# Patient Record
Sex: MALE | Race: ASIAN | NOT HISPANIC OR LATINO | Employment: UNEMPLOYED | URBAN - METROPOLITAN AREA
[De-identification: names, ages, dates, MRNs, and addresses within clinical notes are randomized per-mention and may not be internally consistent; named-entity substitution may affect disease eponyms.]

---

## 2017-05-22 ENCOUNTER — OFFICE VISIT - HEALTHEAST (OUTPATIENT)
Dept: FAMILY MEDICINE | Facility: CLINIC | Age: 77
End: 2017-05-22

## 2017-05-22 DIAGNOSIS — J02.9 SORE THROAT: ICD-10-CM

## 2017-05-22 DIAGNOSIS — I10 HIGH BLOOD PRESSURE: ICD-10-CM

## 2017-05-22 DIAGNOSIS — E11.9 TYPE 2 DIABETES MELLITUS WITHOUT COMPLICATION, WITHOUT LONG-TERM CURRENT USE OF INSULIN (H): ICD-10-CM

## 2017-05-22 LAB
HBA1C MFR BLD: 9.4 % (ref 3.5–6)
LDLC SERPL CALC-MCNC: 152 MG/DL

## 2017-05-22 ASSESSMENT — MIFFLIN-ST. JEOR: SCORE: 1171.2

## 2017-05-24 ENCOUNTER — COMMUNICATION - HEALTHEAST (OUTPATIENT)
Dept: FAMILY MEDICINE | Facility: CLINIC | Age: 77
End: 2017-05-24

## 2017-06-12 ENCOUNTER — COMMUNICATION - HEALTHEAST (OUTPATIENT)
Dept: FAMILY MEDICINE | Facility: CLINIC | Age: 77
End: 2017-06-12

## 2017-06-23 ENCOUNTER — OFFICE VISIT - HEALTHEAST (OUTPATIENT)
Dept: FAMILY MEDICINE | Facility: CLINIC | Age: 77
End: 2017-06-23

## 2017-06-23 DIAGNOSIS — I10 ESSENTIAL HYPERTENSION WITH GOAL BLOOD PRESSURE LESS THAN 140/90: ICD-10-CM

## 2017-06-23 DIAGNOSIS — E11.29 TYPE 2 DIABETES MELLITUS WITH KIDNEY COMPLICATION, WITHOUT LONG-TERM CURRENT USE OF INSULIN (H): ICD-10-CM

## 2017-06-23 DIAGNOSIS — N18.30 CKD (CHRONIC KIDNEY DISEASE) STAGE 3, GFR 30-59 ML/MIN (H): ICD-10-CM

## 2017-06-23 RX ORDER — GLUCOSAMINE HCL/CHONDROITIN SU 500-400 MG
1 CAPSULE ORAL PRN
Qty: 100 EACH | Refills: 11 | Status: SHIPPED | OUTPATIENT
Start: 2017-06-23 | End: 2021-09-23

## 2017-06-23 RX ORDER — BLOOD PRESSURE TEST KIT
KIT MISCELLANEOUS
Qty: 1 EACH | Refills: 0 | Status: SHIPPED | OUTPATIENT
Start: 2017-06-23 | End: 2021-09-23

## 2017-06-23 ASSESSMENT — MIFFLIN-ST. JEOR: SCORE: 1168.93

## 2018-06-07 ENCOUNTER — OFFICE VISIT - HEALTHEAST (OUTPATIENT)
Dept: FAMILY MEDICINE | Facility: CLINIC | Age: 78
End: 2018-06-07

## 2018-06-07 DIAGNOSIS — E78.00 PURE HYPERCHOLESTEROLEMIA: ICD-10-CM

## 2018-06-07 DIAGNOSIS — Z00.00 ROUTINE GENERAL MEDICAL EXAMINATION AT A HEALTH CARE FACILITY: ICD-10-CM

## 2018-06-07 DIAGNOSIS — R19.5 NONSPECIFIC ABNORMAL FINDING IN STOOL CONTENTS: ICD-10-CM

## 2018-06-07 DIAGNOSIS — N18.30 CKD (CHRONIC KIDNEY DISEASE) STAGE 3, GFR 30-59 ML/MIN (H): ICD-10-CM

## 2018-06-07 DIAGNOSIS — E11.29 TYPE 2 DIABETES MELLITUS WITH KIDNEY COMPLICATION, WITHOUT LONG-TERM CURRENT USE OF INSULIN (H): ICD-10-CM

## 2018-06-07 LAB
ALBUMIN SERPL-MCNC: 3.9 G/DL (ref 3.5–5)
ALP SERPL-CCNC: 146 U/L (ref 45–120)
ALT SERPL W P-5'-P-CCNC: 22 U/L (ref 0–45)
ANION GAP SERPL CALCULATED.3IONS-SCNC: 11 MMOL/L (ref 5–18)
AST SERPL W P-5'-P-CCNC: 19 U/L (ref 0–40)
BILIRUB SERPL-MCNC: 1 MG/DL (ref 0–1)
BUN SERPL-MCNC: 21 MG/DL (ref 8–28)
CALCIUM SERPL-MCNC: 9.8 MG/DL (ref 8.5–10.5)
CHLORIDE BLD-SCNC: 107 MMOL/L (ref 98–107)
CO2 SERPL-SCNC: 21 MMOL/L (ref 22–31)
CREAT SERPL-MCNC: 1.63 MG/DL (ref 0.7–1.3)
CREAT UR-MCNC: 197.8 MG/DL
ERYTHROCYTE [DISTWIDTH] IN BLOOD BY AUTOMATED COUNT: 11.6 % (ref 11–14.5)
GFR SERPL CREATININE-BSD FRML MDRD: 41 ML/MIN/1.73M2
GLUCOSE BLD-MCNC: 243 MG/DL (ref 70–125)
HBA1C MFR BLD: 12.6 % (ref 3.5–6)
HCT VFR BLD AUTO: 43.3 % (ref 40–54)
HGB BLD-MCNC: 14.6 G/DL (ref 14–18)
LDLC SERPL CALC-MCNC: 83 MG/DL
MCH RBC QN AUTO: 31.1 PG (ref 27–34)
MCHC RBC AUTO-ENTMCNC: 33.7 G/DL (ref 32–36)
MCV RBC AUTO: 92 FL (ref 80–100)
MICROALBUMIN UR-MCNC: 15.19 MG/DL (ref 0–1.99)
MICROALBUMIN/CREAT UR: 76.8 MG/G
PLATELET # BLD AUTO: 159 THOU/UL (ref 140–440)
PMV BLD AUTO: 8.7 FL (ref 7–10)
POTASSIUM BLD-SCNC: 4.3 MMOL/L (ref 3.5–5)
PROT SERPL-MCNC: 7.3 G/DL (ref 6–8)
RBC # BLD AUTO: 4.7 MILL/UL (ref 4.4–6.2)
SODIUM SERPL-SCNC: 139 MMOL/L (ref 136–145)
WBC: 9.3 THOU/UL (ref 4–11)

## 2018-06-07 ASSESSMENT — MIFFLIN-ST. JEOR: SCORE: 1132.64

## 2018-06-08 ENCOUNTER — RECORDS - HEALTHEAST (OUTPATIENT)
Dept: ADMINISTRATIVE | Facility: OTHER | Age: 78
End: 2018-06-08

## 2018-06-08 ENCOUNTER — COMMUNICATION - HEALTHEAST (OUTPATIENT)
Dept: FAMILY MEDICINE | Facility: CLINIC | Age: 78
End: 2018-06-08

## 2018-06-08 DIAGNOSIS — E11.21 TYPE 2 DIABETES MELLITUS WITH DIABETIC NEPHROPATHY, WITHOUT LONG-TERM CURRENT USE OF INSULIN (H): ICD-10-CM

## 2018-07-20 ENCOUNTER — OFFICE VISIT - HEALTHEAST (OUTPATIENT)
Dept: FAMILY MEDICINE | Facility: CLINIC | Age: 78
End: 2018-07-20

## 2018-07-20 DIAGNOSIS — N18.30 CKD (CHRONIC KIDNEY DISEASE) STAGE 3, GFR 30-59 ML/MIN (H): ICD-10-CM

## 2018-07-20 DIAGNOSIS — E11.3299 NON-PROLIFERATIVE DIABETIC RETINOPATHY (H): ICD-10-CM

## 2018-07-20 DIAGNOSIS — E11.29 TYPE 2 DIABETES MELLITUS WITH KIDNEY COMPLICATION, WITHOUT LONG-TERM CURRENT USE OF INSULIN (H): ICD-10-CM

## 2018-07-20 DIAGNOSIS — R80.9 PROTEINURIA: ICD-10-CM

## 2018-07-20 LAB — HBA1C MFR BLD: 9.4 % (ref 3.5–6)

## 2018-08-24 ENCOUNTER — RECORDS - HEALTHEAST (OUTPATIENT)
Dept: ADMINISTRATIVE | Facility: OTHER | Age: 78
End: 2018-08-24

## 2018-08-24 ENCOUNTER — OFFICE VISIT - HEALTHEAST (OUTPATIENT)
Dept: FAMILY MEDICINE | Facility: CLINIC | Age: 78
End: 2018-08-24

## 2018-08-24 DIAGNOSIS — N18.30 CKD (CHRONIC KIDNEY DISEASE) STAGE 3, GFR 30-59 ML/MIN (H): ICD-10-CM

## 2018-08-24 DIAGNOSIS — E11.3299 NON-PROLIFERATIVE DIABETIC RETINOPATHY (H): ICD-10-CM

## 2018-08-24 DIAGNOSIS — E11.29 TYPE 2 DIABETES MELLITUS WITH KIDNEY COMPLICATION, WITHOUT LONG-TERM CURRENT USE OF INSULIN (H): ICD-10-CM

## 2018-08-24 DIAGNOSIS — Z01.818 PREOPERATIVE EXAMINATION: ICD-10-CM

## 2018-08-24 DIAGNOSIS — E78.00 PURE HYPERCHOLESTEROLEMIA: ICD-10-CM

## 2018-08-24 LAB — HBA1C MFR BLD: 8.4 % (ref 3.5–6)

## 2018-08-24 ASSESSMENT — MIFFLIN-ST. JEOR: SCORE: 1148.51

## 2018-09-18 ENCOUNTER — RECORDS - HEALTHEAST (OUTPATIENT)
Dept: ADMINISTRATIVE | Facility: OTHER | Age: 78
End: 2018-09-18

## 2019-06-17 ENCOUNTER — OFFICE VISIT - HEALTHEAST (OUTPATIENT)
Dept: ALLERGY | Facility: CLINIC | Age: 79
End: 2019-06-17

## 2019-06-17 DIAGNOSIS — T78.3XXD ANGIOEDEMA, SUBSEQUENT ENCOUNTER: ICD-10-CM

## 2019-06-17 LAB — C4 SERPL-MCNC: 21 MG/DL (ref 19–59)

## 2019-06-17 ASSESSMENT — MIFFLIN-ST. JEOR: SCORE: 1214.29

## 2019-06-18 LAB — SHRIMP IGE QN: <0.35 KU/L

## 2019-06-19 LAB
DEPRECATED MISC ALLERGEN IGE RAST QL: NORMAL
LAMB IGE QN: <0.1 KU/L

## 2019-06-21 LAB — TRYPTASE SERPL-MCNC: 7.5 UG/L

## 2019-06-24 ENCOUNTER — COMMUNICATION - HEALTHEAST (OUTPATIENT)
Dept: ALLERGY | Facility: CLINIC | Age: 79
End: 2019-06-24

## 2019-09-03 ENCOUNTER — COMMUNICATION - HEALTHEAST (OUTPATIENT)
Dept: FAMILY MEDICINE | Facility: CLINIC | Age: 79
End: 2019-09-03

## 2019-09-03 DIAGNOSIS — E11.29 TYPE 2 DIABETES MELLITUS WITH KIDNEY COMPLICATION, WITHOUT LONG-TERM CURRENT USE OF INSULIN (H): ICD-10-CM

## 2019-10-02 ENCOUNTER — OFFICE VISIT - HEALTHEAST (OUTPATIENT)
Dept: FAMILY MEDICINE | Facility: CLINIC | Age: 79
End: 2019-10-02

## 2019-10-02 DIAGNOSIS — E11.3299 NON-PROLIFERATIVE DIABETIC RETINOPATHY (H): ICD-10-CM

## 2019-10-02 DIAGNOSIS — E11.21 TYPE 2 DIABETES MELLITUS WITH DIABETIC NEPHROPATHY, WITHOUT LONG-TERM CURRENT USE OF INSULIN (H): ICD-10-CM

## 2019-10-02 DIAGNOSIS — E78.00 PURE HYPERCHOLESTEROLEMIA: ICD-10-CM

## 2019-10-02 DIAGNOSIS — E11.29 TYPE 2 DIABETES MELLITUS WITH KIDNEY COMPLICATION, WITHOUT LONG-TERM CURRENT USE OF INSULIN (H): ICD-10-CM

## 2019-10-02 DIAGNOSIS — N18.30 CKD (CHRONIC KIDNEY DISEASE) STAGE 3, GFR 30-59 ML/MIN (H): ICD-10-CM

## 2019-10-02 DIAGNOSIS — I10 ESSENTIAL HYPERTENSION: ICD-10-CM

## 2019-10-02 DIAGNOSIS — R80.8 OTHER PROTEINURIA: ICD-10-CM

## 2019-10-02 LAB
ALBUMIN SERPL-MCNC: 4.1 G/DL (ref 3.5–5)
ALP SERPL-CCNC: 152 U/L (ref 45–120)
ALT SERPL W P-5'-P-CCNC: 25 U/L (ref 0–45)
ANION GAP SERPL CALCULATED.3IONS-SCNC: 9 MMOL/L (ref 5–18)
AST SERPL W P-5'-P-CCNC: 21 U/L (ref 0–40)
BILIRUB SERPL-MCNC: 0.5 MG/DL (ref 0–1)
BUN SERPL-MCNC: 23 MG/DL (ref 8–28)
CALCIUM SERPL-MCNC: 9.7 MG/DL (ref 8.5–10.5)
CHLORIDE BLD-SCNC: 106 MMOL/L (ref 98–107)
CO2 SERPL-SCNC: 25 MMOL/L (ref 22–31)
CREAT SERPL-MCNC: 1.97 MG/DL (ref 0.7–1.3)
CREAT UR-MCNC: 120.4 MG/DL
ERYTHROCYTE [DISTWIDTH] IN BLOOD BY AUTOMATED COUNT: 11.6 % (ref 11–14.5)
GFR SERPL CREATININE-BSD FRML MDRD: 33 ML/MIN/1.73M2
GLUCOSE BLD-MCNC: 270 MG/DL (ref 70–125)
HBA1C MFR BLD: 8.5 % (ref 3.5–6)
HCT VFR BLD AUTO: 42.5 % (ref 40–54)
HGB BLD-MCNC: 14.5 G/DL (ref 14–18)
LDLC SERPL CALC-MCNC: 115 MG/DL
MCH RBC QN AUTO: 31.3 PG (ref 27–34)
MCHC RBC AUTO-ENTMCNC: 34.2 G/DL (ref 32–36)
MCV RBC AUTO: 92 FL (ref 80–100)
MICROALBUMIN UR-MCNC: 17.39 MG/DL (ref 0–1.99)
MICROALBUMIN/CREAT UR: 144.4 MG/G
PLATELET # BLD AUTO: 145 THOU/UL (ref 140–440)
PMV BLD AUTO: 9 FL (ref 7–10)
POTASSIUM BLD-SCNC: 4 MMOL/L (ref 3.5–5)
PROT SERPL-MCNC: 7.5 G/DL (ref 6–8)
RBC # BLD AUTO: 4.63 MILL/UL (ref 4.4–6.2)
SODIUM SERPL-SCNC: 140 MMOL/L (ref 136–145)
WBC: 9.8 THOU/UL (ref 4–11)

## 2019-10-03 ENCOUNTER — COMMUNICATION - HEALTHEAST (OUTPATIENT)
Dept: FAMILY MEDICINE | Facility: CLINIC | Age: 79
End: 2019-10-03

## 2019-10-15 ENCOUNTER — COMMUNICATION - HEALTHEAST (OUTPATIENT)
Dept: GERIATRIC MEDICINE | Facility: CLINIC | Age: 79
End: 2019-10-15

## 2019-11-08 ENCOUNTER — OFFICE VISIT - HEALTHEAST (OUTPATIENT)
Dept: FAMILY MEDICINE | Facility: CLINIC | Age: 79
End: 2019-11-08

## 2019-11-08 DIAGNOSIS — J18.9 PNEUMONIA OF RIGHT LOWER LOBE DUE TO INFECTIOUS ORGANISM: ICD-10-CM

## 2019-11-08 DIAGNOSIS — R04.2 COUGH WITH HEMOPTYSIS: ICD-10-CM

## 2019-11-08 DIAGNOSIS — N18.30 CKD (CHRONIC KIDNEY DISEASE) STAGE 3, GFR 30-59 ML/MIN (H): ICD-10-CM

## 2019-11-08 LAB
ANION GAP SERPL CALCULATED.3IONS-SCNC: 9 MMOL/L (ref 5–18)
BUN SERPL-MCNC: 25 MG/DL (ref 8–28)
CHLORIDE BLD-SCNC: 109 MMOL/L (ref 98–107)
CO2 SERPL-SCNC: 24 MMOL/L (ref 22–31)
CREAT SERPL-MCNC: 2 MG/DL (ref 0.8–1.5)
ERYTHROCYTE [DISTWIDTH] IN BLOOD BY AUTOMATED COUNT: 10.7 % (ref 11–14.5)
FLUAV AG SPEC QL IA: NORMAL
FLUBV AG SPEC QL IA: NORMAL
GLUCOSE BLD-MCNC: 149 MG/DL (ref 70–125)
HCT VFR BLD AUTO: 42.9 % (ref 40–54)
HGB BLD-MCNC: 14.5 G/DL (ref 14–18)
MCH RBC QN AUTO: 31.5 PG (ref 27–34)
MCHC RBC AUTO-ENTMCNC: 33.8 G/DL (ref 32–36)
MCV RBC AUTO: 93 FL (ref 80–100)
PLATELET # BLD AUTO: 176 THOU/UL (ref 140–440)
PMV BLD AUTO: 7.9 FL (ref 7–10)
POTASSIUM BLD-SCNC: 4 MMOL/L (ref 3.5–5.5)
RBC # BLD AUTO: 4.61 MILL/UL (ref 4.4–6.2)
SODIUM SERPL-SCNC: 142 MMOL/L (ref 136–145)
WBC: 8.9 THOU/UL (ref 4–11)

## 2019-11-15 ENCOUNTER — OFFICE VISIT - HEALTHEAST (OUTPATIENT)
Dept: FAMILY MEDICINE | Facility: CLINIC | Age: 79
End: 2019-11-15

## 2019-11-15 DIAGNOSIS — I10 ESSENTIAL HYPERTENSION: ICD-10-CM

## 2019-11-15 DIAGNOSIS — N18.30 CKD (CHRONIC KIDNEY DISEASE) STAGE 3, GFR 30-59 ML/MIN (H): ICD-10-CM

## 2019-11-15 DIAGNOSIS — R80.8 OTHER PROTEINURIA: ICD-10-CM

## 2019-11-15 DIAGNOSIS — E11.21 TYPE 2 DIABETES MELLITUS WITH DIABETIC NEPHROPATHY, WITHOUT LONG-TERM CURRENT USE OF INSULIN (H): ICD-10-CM

## 2019-12-06 ENCOUNTER — COMMUNICATION - HEALTHEAST (OUTPATIENT)
Dept: GERIATRIC MEDICINE | Facility: CLINIC | Age: 79
End: 2019-12-06

## 2019-12-06 ENCOUNTER — COMMUNICATION - HEALTHEAST (OUTPATIENT)
Dept: FAMILY MEDICINE | Facility: CLINIC | Age: 79
End: 2019-12-06

## 2019-12-06 ENCOUNTER — OFFICE VISIT - HEALTHEAST (OUTPATIENT)
Dept: FAMILY MEDICINE | Facility: CLINIC | Age: 79
End: 2019-12-06

## 2019-12-06 ENCOUNTER — RECORDS - HEALTHEAST (OUTPATIENT)
Dept: GENERAL RADIOLOGY | Facility: CLINIC | Age: 79
End: 2019-12-06

## 2019-12-06 DIAGNOSIS — I10 ESSENTIAL HYPERTENSION: ICD-10-CM

## 2019-12-06 DIAGNOSIS — J18.9 PNEUMONIA OF RIGHT LOWER LOBE DUE TO INFECTIOUS ORGANISM: ICD-10-CM

## 2019-12-06 DIAGNOSIS — J81.1 CHRONIC PULMONARY EDEMA: ICD-10-CM

## 2019-12-06 DIAGNOSIS — R80.8 OTHER PROTEINURIA: ICD-10-CM

## 2019-12-06 DIAGNOSIS — J18.1 LOBAR PNEUMONIA, UNSPECIFIED ORGANISM (H): ICD-10-CM

## 2019-12-20 ENCOUNTER — OFFICE VISIT - HEALTHEAST (OUTPATIENT)
Dept: FAMILY MEDICINE | Facility: CLINIC | Age: 79
End: 2019-12-20

## 2019-12-20 DIAGNOSIS — E11.22 TYPE 2 DIABETES MELLITUS WITH STAGE 3 CHRONIC KIDNEY DISEASE, WITHOUT LONG-TERM CURRENT USE OF INSULIN (H): ICD-10-CM

## 2019-12-20 DIAGNOSIS — N18.30 TYPE 2 DIABETES MELLITUS WITH STAGE 3 CHRONIC KIDNEY DISEASE, WITHOUT LONG-TERM CURRENT USE OF INSULIN (H): ICD-10-CM

## 2019-12-20 DIAGNOSIS — I10 ESSENTIAL HYPERTENSION: ICD-10-CM

## 2019-12-20 DIAGNOSIS — R80.8 OTHER PROTEINURIA: ICD-10-CM

## 2020-01-08 ENCOUNTER — COMMUNICATION - HEALTHEAST (OUTPATIENT)
Dept: GERIATRIC MEDICINE | Facility: CLINIC | Age: 80
End: 2020-01-08

## 2020-01-24 ENCOUNTER — OFFICE VISIT - HEALTHEAST (OUTPATIENT)
Dept: FAMILY MEDICINE | Facility: CLINIC | Age: 80
End: 2020-01-24

## 2020-01-24 DIAGNOSIS — N18.30 CKD (CHRONIC KIDNEY DISEASE) STAGE 3, GFR 30-59 ML/MIN (H): ICD-10-CM

## 2020-01-24 DIAGNOSIS — I10 ESSENTIAL HYPERTENSION: ICD-10-CM

## 2020-01-24 DIAGNOSIS — E11.29 TYPE 2 DIABETES MELLITUS WITH MICROALBUMINURIA, WITHOUT LONG-TERM CURRENT USE OF INSULIN (H): ICD-10-CM

## 2020-01-24 DIAGNOSIS — R80.9 TYPE 2 DIABETES MELLITUS WITH MICROALBUMINURIA, WITHOUT LONG-TERM CURRENT USE OF INSULIN (H): ICD-10-CM

## 2020-03-25 ENCOUNTER — OFFICE VISIT - HEALTHEAST (OUTPATIENT)
Dept: FAMILY MEDICINE | Facility: CLINIC | Age: 80
End: 2020-03-25

## 2020-03-25 DIAGNOSIS — R80.8 OTHER PROTEINURIA: ICD-10-CM

## 2020-03-25 DIAGNOSIS — N18.30 CKD (CHRONIC KIDNEY DISEASE) STAGE 3, GFR 30-59 ML/MIN (H): ICD-10-CM

## 2020-03-25 DIAGNOSIS — E11.21 TYPE 2 DIABETES MELLITUS WITH DIABETIC NEPHROPATHY, WITHOUT LONG-TERM CURRENT USE OF INSULIN (H): ICD-10-CM

## 2020-03-25 DIAGNOSIS — I10 ESSENTIAL HYPERTENSION: ICD-10-CM

## 2020-03-30 ENCOUNTER — AMBULATORY - HEALTHEAST (OUTPATIENT)
Dept: LAB | Facility: CLINIC | Age: 80
End: 2020-03-30

## 2020-03-30 ENCOUNTER — COMMUNICATION - HEALTHEAST (OUTPATIENT)
Dept: FAMILY MEDICINE | Facility: CLINIC | Age: 80
End: 2020-03-30

## 2020-03-30 DIAGNOSIS — I10 ESSENTIAL HYPERTENSION: ICD-10-CM

## 2020-03-30 DIAGNOSIS — E11.21 TYPE 2 DIABETES MELLITUS WITH DIABETIC NEPHROPATHY, WITHOUT LONG-TERM CURRENT USE OF INSULIN (H): ICD-10-CM

## 2020-03-30 DIAGNOSIS — R80.8 OTHER PROTEINURIA: ICD-10-CM

## 2020-03-30 DIAGNOSIS — N18.30 CKD (CHRONIC KIDNEY DISEASE) STAGE 3, GFR 30-59 ML/MIN (H): ICD-10-CM

## 2020-03-30 LAB
ALBUMIN SERPL-MCNC: 3.9 G/DL (ref 3.5–5)
ALP SERPL-CCNC: 127 U/L (ref 45–120)
ALT SERPL W P-5'-P-CCNC: 33 U/L (ref 0–45)
ANION GAP SERPL CALCULATED.3IONS-SCNC: 9 MMOL/L (ref 5–18)
AST SERPL W P-5'-P-CCNC: 23 U/L (ref 0–40)
BASOPHILS # BLD AUTO: 0.1 THOU/UL (ref 0–0.2)
BASOPHILS NFR BLD AUTO: 1 % (ref 0–2)
BILIRUB SERPL-MCNC: 0.6 MG/DL (ref 0–1)
BUN SERPL-MCNC: 30 MG/DL (ref 8–28)
CALCIUM SERPL-MCNC: 9 MG/DL (ref 8.5–10.5)
CHLORIDE BLD-SCNC: 108 MMOL/L (ref 98–107)
CO2 SERPL-SCNC: 22 MMOL/L (ref 22–31)
CREAT SERPL-MCNC: 2.14 MG/DL (ref 0.7–1.3)
EOSINOPHIL # BLD AUTO: 0.4 THOU/UL (ref 0–0.4)
EOSINOPHIL NFR BLD AUTO: 5 % (ref 0–6)
ERYTHROCYTE [DISTWIDTH] IN BLOOD BY AUTOMATED COUNT: 12 % (ref 11–14.5)
GFR SERPL CREATININE-BSD FRML MDRD: 30 ML/MIN/1.73M2
GLUCOSE BLD-MCNC: 220 MG/DL (ref 70–125)
HBA1C MFR BLD: 9.4 % (ref 3.5–6)
HCT VFR BLD AUTO: 39.9 % (ref 40–54)
HGB BLD-MCNC: 13.7 G/DL (ref 14–18)
LYMPHOCYTES # BLD AUTO: 3.1 THOU/UL (ref 0.8–4.4)
LYMPHOCYTES NFR BLD AUTO: 38 % (ref 20–40)
MCH RBC QN AUTO: 31.7 PG (ref 27–34)
MCHC RBC AUTO-ENTMCNC: 34.3 G/DL (ref 32–36)
MCV RBC AUTO: 92 FL (ref 80–100)
MONOCYTES # BLD AUTO: 0.6 THOU/UL (ref 0–0.9)
MONOCYTES NFR BLD AUTO: 7 % (ref 2–10)
NEUTROPHILS # BLD AUTO: 4.2 THOU/UL (ref 2–7.7)
NEUTROPHILS NFR BLD AUTO: 50 % (ref 50–70)
PLATELET # BLD AUTO: 137 THOU/UL (ref 140–440)
PMV BLD AUTO: 9.3 FL (ref 7–10)
POTASSIUM BLD-SCNC: 3.9 MMOL/L (ref 3.5–5)
PROT SERPL-MCNC: 7.1 G/DL (ref 6–8)
RBC # BLD AUTO: 4.32 MILL/UL (ref 4.4–6.2)
SODIUM SERPL-SCNC: 139 MMOL/L (ref 136–145)
WBC: 8.4 THOU/UL (ref 4–11)

## 2020-03-31 ENCOUNTER — COMMUNICATION - HEALTHEAST (OUTPATIENT)
Dept: FAMILY MEDICINE | Facility: CLINIC | Age: 80
End: 2020-03-31

## 2020-05-07 ENCOUNTER — OFFICE VISIT - HEALTHEAST (OUTPATIENT)
Dept: FAMILY MEDICINE | Facility: CLINIC | Age: 80
End: 2020-05-07

## 2020-05-07 DIAGNOSIS — R80.8 OTHER PROTEINURIA: ICD-10-CM

## 2020-05-07 DIAGNOSIS — E11.21 TYPE 2 DIABETES MELLITUS WITH DIABETIC NEPHROPATHY, WITHOUT LONG-TERM CURRENT USE OF INSULIN (H): ICD-10-CM

## 2020-05-07 DIAGNOSIS — N18.30 CKD (CHRONIC KIDNEY DISEASE) STAGE 3, GFR 30-59 ML/MIN (H): ICD-10-CM

## 2020-05-08 ENCOUNTER — AMBULATORY - HEALTHEAST (OUTPATIENT)
Dept: LAB | Facility: CLINIC | Age: 80
End: 2020-05-08

## 2020-05-08 ENCOUNTER — AMBULATORY - HEALTHEAST (OUTPATIENT)
Dept: NURSING | Facility: CLINIC | Age: 80
End: 2020-05-08

## 2020-05-08 DIAGNOSIS — E11.21 TYPE 2 DIABETES MELLITUS WITH DIABETIC NEPHROPATHY, WITHOUT LONG-TERM CURRENT USE OF INSULIN (H): ICD-10-CM

## 2020-05-08 DIAGNOSIS — N18.30 CKD (CHRONIC KIDNEY DISEASE) STAGE 3, GFR 30-59 ML/MIN (H): ICD-10-CM

## 2020-05-08 DIAGNOSIS — R80.8 OTHER PROTEINURIA: ICD-10-CM

## 2020-05-08 DIAGNOSIS — I10 ESSENTIAL HYPERTENSION: ICD-10-CM

## 2020-05-08 LAB
ANION GAP SERPL CALCULATED.3IONS-SCNC: 12 MMOL/L (ref 5–18)
BUN SERPL-MCNC: 28 MG/DL (ref 8–28)
CALCIUM SERPL-MCNC: 9.2 MG/DL (ref 8.5–10.5)
CHLORIDE BLD-SCNC: 105 MMOL/L (ref 98–107)
CO2 SERPL-SCNC: 21 MMOL/L (ref 22–31)
CREAT SERPL-MCNC: 2.03 MG/DL (ref 0.7–1.3)
ERYTHROCYTE [DISTWIDTH] IN BLOOD BY AUTOMATED COUNT: 11.7 % (ref 11–14.5)
GFR SERPL CREATININE-BSD FRML MDRD: 32 ML/MIN/1.73M2
GLUCOSE BLD-MCNC: 181 MG/DL (ref 70–125)
HBA1C MFR BLD: 9.5 % (ref 3.5–6)
HCT VFR BLD AUTO: 42.5 % (ref 40–54)
HGB BLD-MCNC: 14 G/DL (ref 14–18)
MCH RBC QN AUTO: 30.6 PG (ref 27–34)
MCHC RBC AUTO-ENTMCNC: 33 G/DL (ref 32–36)
MCV RBC AUTO: 93 FL (ref 80–100)
PLATELET # BLD AUTO: 146 THOU/UL (ref 140–440)
PMV BLD AUTO: 8.7 FL (ref 7–10)
POTASSIUM BLD-SCNC: 4.2 MMOL/L (ref 3.5–5)
RBC # BLD AUTO: 4.58 MILL/UL (ref 4.4–6.2)
SODIUM SERPL-SCNC: 138 MMOL/L (ref 136–145)
WBC: 9 THOU/UL (ref 4–11)

## 2020-05-08 ASSESSMENT — MIFFLIN-ST. JEOR: SCORE: 1182.53

## 2020-05-11 ENCOUNTER — OFFICE VISIT - HEALTHEAST (OUTPATIENT)
Dept: FAMILY MEDICINE | Facility: CLINIC | Age: 80
End: 2020-05-11

## 2020-05-11 ENCOUNTER — COMMUNICATION - HEALTHEAST (OUTPATIENT)
Dept: FAMILY MEDICINE | Facility: CLINIC | Age: 80
End: 2020-05-11

## 2020-05-11 DIAGNOSIS — N18.30 CKD (CHRONIC KIDNEY DISEASE) STAGE 3, GFR 30-59 ML/MIN (H): ICD-10-CM

## 2020-05-11 DIAGNOSIS — I10 ESSENTIAL HYPERTENSION: ICD-10-CM

## 2020-05-11 DIAGNOSIS — E11.21 TYPE 2 DIABETES MELLITUS WITH DIABETIC NEPHROPATHY, WITHOUT LONG-TERM CURRENT USE OF INSULIN (H): ICD-10-CM

## 2020-07-24 ENCOUNTER — COMMUNICATION - HEALTHEAST (OUTPATIENT)
Dept: GERIATRIC MEDICINE | Facility: CLINIC | Age: 80
End: 2020-07-24

## 2020-07-24 ASSESSMENT — ACTIVITIES OF DAILY LIVING (ADL): DEPENDENT_IADLS:: INDEPENDENT

## 2020-07-28 ENCOUNTER — COMMUNICATION - HEALTHEAST (OUTPATIENT)
Dept: GERIATRIC MEDICINE | Facility: CLINIC | Age: 80
End: 2020-07-28

## 2020-08-19 ENCOUNTER — COMMUNICATION - HEALTHEAST (OUTPATIENT)
Dept: FAMILY MEDICINE | Facility: CLINIC | Age: 80
End: 2020-08-19

## 2020-11-02 ENCOUNTER — COMMUNICATION - HEALTHEAST (OUTPATIENT)
Dept: FAMILY MEDICINE | Facility: CLINIC | Age: 80
End: 2020-11-02

## 2020-11-02 DIAGNOSIS — E11.29 TYPE 2 DIABETES MELLITUS WITH KIDNEY COMPLICATION, WITHOUT LONG-TERM CURRENT USE OF INSULIN (H): ICD-10-CM

## 2020-12-24 ENCOUNTER — OFFICE VISIT - HEALTHEAST (OUTPATIENT)
Dept: FAMILY MEDICINE | Facility: CLINIC | Age: 80
End: 2020-12-24

## 2020-12-24 ENCOUNTER — COMMUNICATION - HEALTHEAST (OUTPATIENT)
Dept: TELEHEALTH | Facility: CLINIC | Age: 80
End: 2020-12-24

## 2020-12-24 DIAGNOSIS — E11.29 TYPE 2 DIABETES MELLITUS WITH KIDNEY COMPLICATION, WITHOUT LONG-TERM CURRENT USE OF INSULIN (H): ICD-10-CM

## 2020-12-24 DIAGNOSIS — E11.21 TYPE 2 DIABETES MELLITUS WITH DIABETIC NEPHROPATHY, WITHOUT LONG-TERM CURRENT USE OF INSULIN (H): ICD-10-CM

## 2020-12-24 DIAGNOSIS — I10 ESSENTIAL HYPERTENSION: ICD-10-CM

## 2020-12-24 DIAGNOSIS — N18.32 STAGE 3B CHRONIC KIDNEY DISEASE (H): ICD-10-CM

## 2020-12-24 LAB
ALT SERPL W P-5'-P-CCNC: 27 U/L (ref 0–45)
ANION GAP SERPL CALCULATED.3IONS-SCNC: 14 MMOL/L (ref 5–18)
BUN SERPL-MCNC: 33 MG/DL (ref 8–28)
CALCIUM SERPL-MCNC: 9.5 MG/DL (ref 8.5–10.5)
CHLORIDE BLD-SCNC: 109 MMOL/L (ref 98–107)
CHOLEST SERPL-MCNC: 219 MG/DL
CO2 SERPL-SCNC: 17 MMOL/L (ref 22–31)
CREAT SERPL-MCNC: 2.27 MG/DL (ref 0.7–1.3)
CREAT UR-MCNC: 163.4 MG/DL
FASTING STATUS PATIENT QL REPORTED: YES
GFR SERPL CREATININE-BSD FRML MDRD: 28 ML/MIN/1.73M2
GLUCOSE BLD-MCNC: 154 MG/DL (ref 70–125)
HBA1C MFR BLD: 6.8 %
HDLC SERPL-MCNC: 28 MG/DL
LDLC SERPL CALC-MCNC: 145 MG/DL
MICROALBUMIN UR-MCNC: 16.83 MG/DL (ref 0–1.99)
MICROALBUMIN/CREAT UR: 103 MG/G
POTASSIUM BLD-SCNC: 4.4 MMOL/L (ref 3.5–5)
SODIUM SERPL-SCNC: 140 MMOL/L (ref 136–145)
TRIGL SERPL-MCNC: 230 MG/DL

## 2020-12-27 ENCOUNTER — COMMUNICATION - HEALTHEAST (OUTPATIENT)
Dept: FAMILY MEDICINE | Facility: CLINIC | Age: 80
End: 2020-12-27

## 2020-12-27 RX ORDER — GLIMEPIRIDE 4 MG/1
4 TABLET ORAL
Qty: 90 TABLET | Refills: 1 | Status: SHIPPED | OUTPATIENT
Start: 2020-12-27 | End: 2021-09-23

## 2020-12-28 ENCOUNTER — COMMUNICATION - HEALTHEAST (OUTPATIENT)
Dept: FAMILY MEDICINE | Facility: CLINIC | Age: 80
End: 2020-12-28

## 2021-01-04 ENCOUNTER — COMMUNICATION - HEALTHEAST (OUTPATIENT)
Dept: FAMILY MEDICINE | Facility: CLINIC | Age: 81
End: 2021-01-04

## 2021-01-04 DIAGNOSIS — E11.29 TYPE 2 DIABETES MELLITUS WITH KIDNEY COMPLICATION, WITHOUT LONG-TERM CURRENT USE OF INSULIN (H): ICD-10-CM

## 2021-01-04 RX ORDER — LANCETS 33 GAUGE
EACH MISCELLANEOUS
Qty: 100 EACH | Refills: 2 | Status: SHIPPED | OUTPATIENT
Start: 2021-01-04 | End: 2021-09-23

## 2021-02-11 ENCOUNTER — COMMUNICATION - HEALTHEAST (OUTPATIENT)
Dept: GERIATRIC MEDICINE | Facility: CLINIC | Age: 81
End: 2021-02-11

## 2021-02-16 ENCOUNTER — AMBULATORY - HEALTHEAST (OUTPATIENT)
Dept: NURSING | Facility: CLINIC | Age: 81
End: 2021-02-16

## 2021-03-02 ENCOUNTER — COMMUNICATION - HEALTHEAST (OUTPATIENT)
Dept: FAMILY MEDICINE | Facility: CLINIC | Age: 81
End: 2021-03-02

## 2021-03-02 DIAGNOSIS — E11.21 TYPE 2 DIABETES MELLITUS WITH DIABETIC NEPHROPATHY, WITHOUT LONG-TERM CURRENT USE OF INSULIN (H): ICD-10-CM

## 2021-03-18 ENCOUNTER — AMBULATORY - HEALTHEAST (OUTPATIENT)
Dept: NURSING | Facility: CLINIC | Age: 81
End: 2021-03-18

## 2021-05-29 NOTE — PATIENT INSTRUCTIONS - HE
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  Timing of symptoms with eating makes food allergy unlikely.  He was on no medications at the time.     Plan:    We will do C4 and tryptase level.  If new episodes, recommend taking pictures.  Antihistamines such as cetirizine 10 mg morning and night can be used at onset.  Follow-up in allergy clinic with any additional episodes.

## 2021-05-29 NOTE — PROGRESS NOTES
Assessment:    Isolated angioedema without clear allergy trigger.  Timing of symptoms with eating makes food allergy unlikely.  He was on no medications at the time.     Plan:    We will do C4 and tryptase level.  IgE for Lamb and shrimp.  If new episodes, recommend taking pictures.  Reviewed seeking medical attention with danger signs.  No EpiPen to be prescribed at this time.  Antihistamines such as cetirizine 10 mg morning and night can be used at onset.  Follow-up in allergy clinic with any additional episodes.   ____________________________________________________________________________     Patient comes in today for evaluation of recent episode with swelling around his eyes.  This was on Aubree 10.  The day before he was at a family gathering it.  He recalls eating lamb and shrimp.  These with where the only unusual foods.  He ate them at approximately 6:00 PM.  He does not recall having symptoms that evening however upon waking the next morning he had swelling of both eyes.  He was able to see.  He recalls that his face was itchy.  He does not recall any rash.  He did go to the emergency room.  On exam he was noted to have significant swelling around both of his eyes but otherwise normal exam.  No hypotension.  No wheezing.  Patient was given steroids, Benadryl and Pepcid.  No epinephrine was given.  Symptoms resolved completely in the emergency room.  He was discharged home.  He has not had any subsequent return of symptoms.  No previous history of similar symptoms.  No previous history of hives.  Patient was not on any medication at the onset of this.  He does not recall being sick in any way.  No recent symptoms of allergic rhinitis.  No new stresses.    Review of symptoms:  As above, otherwise negative    Past medical history: Hypertension, hypercholesterolemia, chronic kidney disease, type 2 diabetes    Allergies: No known allergies to medications, latex, foods or hymenoptera venom    Family history: No  known member of the family with allergy or asthma.    Social history: Currently lives in house with central air conditioning and basement.  No visible water seepage or mold.  No pets in the home.  No cigarette smoking history.    Medications: reviewed in chart    Physical Exam:  General:  Alert and in no apparent distress.  Eyes:  Sclera clear.  Ears: TMs translucent grey with bony landmarks visible. Nose: Pale, boggy mucosal membranes.  Throat: Pink, moist.  No lesions.  Neck: Supple.  No lymphadenopathy.  Lungs: CTA.  CV: Regular rate and rhythm. Extremities: Well perfused.  No clubbing or cyanosis. Skin: No rash    Patient seen upon request of Cole Malhotra for evaluation of angioedema.

## 2021-05-31 VITALS — BODY MASS INDEX: 23.92 KG/M2 | HEIGHT: 62 IN | WEIGHT: 130 LBS

## 2021-05-31 VITALS — BODY MASS INDEX: 22.5 KG/M2 | WEIGHT: 127 LBS | HEIGHT: 63 IN

## 2021-05-31 NOTE — TELEPHONE ENCOUNTER
RN cannot approve Refill Request    RN can NOT refill this medication Protocol failed and NO refill given.       Virginia Bravo, [facility] Triage/Med Refill 9/3/2019    Requested Prescriptions   Pending Prescriptions Disp Refills     blood glucose test (ONETOUCH VERIO) strips [Pharmacy Med Name: ONETOUCH VERIO STRP STRP] 100 strip 2     Sig: USE AS DIRECTED ONCE DAILY // 1 HNUB SIV 1 ZAUG RAWS LI QHIA       Diabetic Supplies Refill Protocol Failed - 9/3/2019 10:44 AM        Failed - Visit with PCP or prescribing provider visit in last 6 months     Last office visit with prescriber/PCP: 8/24/2018 Norm Strauss MD OR same dept: Visit date not found OR same specialty: 8/24/2018 Norm Strauss MD  Last physical: 6/7/2018 Last MTM visit: Visit date not found   Next visit within 3 mo: Visit date not found  Next physical within 3 mo: Visit date not found  Prescriber OR PCP: Norm Strauss MD  Last diagnosis associated with med order: 1. Type 2 diabetes mellitus with kidney complication, without long-term current use of insulin (H)  - ONETOUCH VERIO strips [Pharmacy Med Name: ONETOUCH VERIO STRP STRP]; USE AS DIRECTED ONCE DAILY // 1 HNUB SIV 1 ZAUG RAWS LI QHIA  Dispense: 100 strip; Refill: 2  - glimepiride (AMARYL) 4 MG tablet [Pharmacy Med Name: GLIMEPIRIDE 4 MG TABS 4 TAB]; TAKE 1 TABLET BY MOUTH ONCE DAILY IN THE MORNING // IB HNUB NOJ 1 LUB THAUM SAWV NTXOV.  Dispense: 90 tablet; Refill: 3    If protocol passes may refill for 12 months if within 3 months of last provider visit (or a total of 15 months).             Failed - A1C in last 6 months     Hemoglobin A1c   Date Value Ref Range Status   08/24/2018 8.4 (H) 3.5 - 6.0 % Final               ONETOUCH DELICA LANCETS 33 gauge Misc [Pharmacy Med Name: ONETOUCH DELICA 33G LANCETS MISC] 100 each 2     Sig: USE AS DIRECTED ONCE DAILY // 1 HNUB SIV 1 ZAUG RAWS LI QHIA       Diabetic Supplies Refill Protocol Failed - 9/3/2019 10:44 AM        Failed - Visit with PCP or  prescribing provider visit in last 6 months     Last office visit with prescriber/PCP: 8/24/2018 Norm Strauss MD OR same dept: Visit date not found OR same specialty: 8/24/2018 Norm Strauss MD  Last physical: 6/7/2018 Last MTM visit: Visit date not found   Next visit within 3 mo: Visit date not found  Next physical within 3 mo: Visit date not found  Prescriber OR PCP: Norm Strauss MD  Last diagnosis associated with med order: 1. Type 2 diabetes mellitus with kidney complication, without long-term current use of insulin (H)  - ONETOUCH VERIO strips [Pharmacy Med Name: ONETOUCH VERIO STRP STRP]; USE AS DIRECTED ONCE DAILY // 1 HNUB SIV 1 GLADIS NEVAREZ QHIA  Dispense: 100 strip; Refill: 2  - glimepiride (AMARYL) 4 MG tablet [Pharmacy Med Name: GLIMEPIRIDE 4 MG TABS 4 TAB]; TAKE 1 TABLET BY MOUTH ONCE DAILY IN THE MORNING // IB HNUB NOJ 1 LUB THAUM SAWV NTXOV.  Dispense: 90 tablet; Refill: 3    If protocol passes may refill for 12 months if within 3 months of last provider visit (or a total of 15 months).             Failed - A1C in last 6 months     Hemoglobin A1c   Date Value Ref Range Status   08/24/2018 8.4 (H) 3.5 - 6.0 % Final               glimepiride (AMARYL) 4 MG tablet [Pharmacy Med Name: GLIMEPIRIDE 4 MG TABS 4 TAB] 90 tablet 3     Sig: TAKE 1 TABLET BY MOUTH ONCE DAILY IN THE MORNING // IB HNUB NOJ 1 LUB [employer] Wet SealUM SAWV NTXOV.       Oral Hypoglycemics Refill Protocol Failed - 9/3/2019 10:44 AM        Failed - Visit with PCP or prescribing provider visit in last 6 months       Last office visit with prescriber/PCP: Visit date not found OR same dept: Visit date not found OR same specialty: 8/24/2018 Norm Strauss MD Last physical: Visit date not found Last MTM visit: Visit date not found         Next appt within 3 mo: Visit date not found  Next physical within 3 mo: Visit date not found  Prescriber OR PCP: Norm Strauss MD  Last diagnosis associated with med order: 1. Type 2 diabetes mellitus with kidney complication,  without long-term current use of insulin (H)  - ONETOUCH VERIO strips [Pharmacy Med Name: ONETOUCH VERIO STRP STRP]; USE AS DIRECTED ONCE DAILY // 1 HNUB SIV 1 GLADIS NEVAREZ QHIA  Dispense: 100 strip; Refill: 2  - glimepiride (AMARYL) 4 MG tablet [Pharmacy Med Name: GLIMEPIRIDE 4 MG TABS 4 TAB]; TAKE 1 TABLET BY MOUTH ONCE DAILY IN THE MORNING // IB HNUB NOJ 1 JONI ALONSO NTXOV.  Dispense: 90 tablet; Refill: 3     If protocol passes may refill for 12 months if within 3 months of last provider visit (or a total of 15 months).           Failed - A1C in last 6 months     Hemoglobin A1c   Date Value Ref Range Status   08/24/2018 8.4 (H) 3.5 - 6.0 % Final               Failed - Microalbumin in last year      Microalbumin, Random Urine   Date Value Ref Range Status   06/07/2018 15.19 (H) 0.00 - 1.99 mg/dL Final                  Failed - Serum creatinine in last year     Creatinine   Date Value Ref Range Status   06/07/2018 1.63 (H) 0.70 - 1.30 mg/dL Final             Passed - Blood pressure in last year     BP Readings from Last 1 Encounters:   06/17/19 128/72

## 2021-06-01 VITALS — HEIGHT: 62 IN | BODY MASS INDEX: 23.1 KG/M2 | WEIGHT: 125.5 LBS

## 2021-06-01 VITALS — BODY MASS INDEX: 22.45 KG/M2 | WEIGHT: 122 LBS | HEIGHT: 62 IN

## 2021-06-01 VITALS — WEIGHT: 127 LBS | BODY MASS INDEX: 23.23 KG/M2

## 2021-06-01 NOTE — PROGRESS NOTES
No meds  No visit over a year  meds last taken august.    diabetes mellitus    One pill per day.        Feels fine  Night urine 0-1  Energy is good.  Does chores around house  Lives with one child at home with daughter in law and grandchild     Nightly exercise.   Walks [employer].   Every night in good weather.    Or home treadmill in bad weather.     Hypertension denies chest pain or headache.  Renal insufficiency denies nausea or vomiting  Diabetes denies polyuria.      OBJECTIVE:   Vitals:    10/02/19 1000   BP: 124/72   Pulse: 64   Resp: 20      Eyes: non icteric, noninflamed  Lungs: no resp distress  Heart: regular  Ankles: no edema  Muscles: nontender  Mental status: euthymic  Neuro: nonfocal    Body mass index is 23.96 kg/m .     ASSESSMENT/PLAN:    79 yo multiple issues.   Feeling fine.   / labs and will rx accordingly    1. Hypercholesterolemia  LDL Cholesterol, Direct   2. Type 2 diabetes mellitus with diabetic nephropathy, without long-term current use of insulin (H)  LDL Cholesterol, Direct    Glycosylated Hemoglobin A1c    Microalbumin, Random Urine   3. Hypertension  Comprehensive Metabolic Panel   4. CKD (chronic kidney disease) stage 3, GFR 30-59 ml/min (H)  Comprehensive Metabolic Panel    HM2(CBC w/o Differential)   5. Non-proliferative diabetic retinopathy (H)     6. Other proteinuria

## 2021-06-02 NOTE — PROGRESS NOTES
[facility] Care Coordination Contact      [facility] Six-Month Telephone Assessment    6 month telephone assessment completed on 10/8/19.    ER visits: Yes -  [facility]  Hospitalizations: No  TCU stays: No  Significant health status changes: none  Falls/Injuries: No  ADL/IADL changes: No  Changes in services: No    Caregiver Assessment follow up:  NA    Goals: See POC in chart for goal progress documentation.      Will see member in 6 months for an annual health risk assessment.   Encouraged member to call CC with any questions or concerns in the meantime.    Giulia Owens RN PHN  [facility]  [phone]

## 2021-06-02 NOTE — TELEPHONE ENCOUNTER
"Called pt and left message for pt to call clinic back#1 Used  Line: Garry ID#[id number]  \"Okay to relay message\"    ----- Message from Norm Strauss MD sent at 10/3/2019  8:03 AM CDT -----  Please call patient.  Tell patient:   Labs are stable.  I sent refill of the diabetes mellitus med to take daily as in the past.  Check in four months    "

## 2021-06-02 NOTE — TELEPHONE ENCOUNTER
Called and left message#2 via  line for patient to call back to relay test results   Pt to the ED for NV and tachycardia starting this morning. Pt states he was hyperglycemics in the 160s, and went to workout and after it was 365. States he remained tachycardic but usually his HR decreases quickly.  pt started feeling NV. Pt took insulin when he saw that his blood glucose was 365, usually takes extra insulin for hyperglycemia but did not today because of tachycardia. Denies recent sickness, urinary sx, CP, SOB or other sx. Pt was seen at Dr. New's office today and told to come to the ED for labs and workup.

## 2021-06-03 VITALS
HEART RATE: 62 BPM | WEIGHT: 132 LBS | RESPIRATION RATE: 20 BRPM | DIASTOLIC BLOOD PRESSURE: 70 MMHG | OXYGEN SATURATION: 99 % | BODY MASS INDEX: 24.14 KG/M2 | SYSTOLIC BLOOD PRESSURE: 148 MMHG | TEMPERATURE: 97.4 F

## 2021-06-03 VITALS
OXYGEN SATURATION: 99 % | BODY MASS INDEX: 24.14 KG/M2 | WEIGHT: 132 LBS | SYSTOLIC BLOOD PRESSURE: 185 MMHG | HEART RATE: 56 BPM | DIASTOLIC BLOOD PRESSURE: 86 MMHG | TEMPERATURE: 98.4 F

## 2021-06-03 VITALS
WEIGHT: 131 LBS | HEART RATE: 64 BPM | RESPIRATION RATE: 20 BRPM | DIASTOLIC BLOOD PRESSURE: 72 MMHG | BODY MASS INDEX: 23.96 KG/M2 | SYSTOLIC BLOOD PRESSURE: 124 MMHG

## 2021-06-03 VITALS — WEIGHT: 140 LBS | HEIGHT: 62 IN | BODY MASS INDEX: 25.76 KG/M2

## 2021-06-03 NOTE — PROGRESS NOTES
follow up hosp   Coughing   Lungs.  Five days meds.  Better.  Back to normal.  Finished meds yesterday    Diabetes denies polyuria.  On one pill daily    Renal insufficiency denies nausea or vomiting    Denies pnd   orth   Does stairs without problems  Non cig             OBJECTIVE:   Vitals:    11/15/19 1147   BP: 148/70   Pulse:    Resp:    Temp:    SpO2:       Wt is noted.  No diaphoresis  Eyes: nl eom, anicteric   External ears, nose: nl    Neck: nl nodes, supple, thyroid normal   Lungs: clear to ausc   Heart: regular rhythm  Abd: soft nontender     No cva (renal) tenderness  Neuro: no weakness  Skin no rash  Joints: uninflamed   No ketotic breath odor noted  Mental: euthymic  Ext: nontender calves   Gait: normal    Body mass index is 24.14 kg/m .     Protein + micro screen  ASSESSMENT/PLAN:    1. Type 2 diabetes mellitus with diabetic nephropathy, without long-term current use of insulin (H)  aspirin 81 MG EC tablet   2. CKD (chronic kidney disease) stage 3, GFR 30-59 ml/min (H)     3. Other proteinuria  enalapril (VASOTEC) 5 MG tablet   4. Hypertension  enalapril (VASOTEC) 5 MG tablet     2 wks check bp, lytes, and follow up cxr (r/o lesion or chf)

## 2021-06-03 NOTE — PROGRESS NOTES
Chief Complaint   Patient presents with     Cough     States he's coughing up blood; sick x 1 week     Sinus Problem     Headache         HPI      Patient is here for one week of productive cough. This week he noted multiple times blood in the sputum. He also reported headache, some body aches, associated with chills. His nasal congestion has improved. No chest pain, shortness of breath, nausea, vomiting, abdominal pain. No recent travel nor know sick contacts.     ROS: Pertinent ROS noted in HPI.     No Known Allergies    Patient Active Problem List   Diagnosis     Type 2 diabetes mellitus with kidney complication, without long-term current use of insulin (H)     Hypercholesterolemia     Hypertension     Hemoccult Positive Stool     CKD (chronic kidney disease) stage 3, GFR 30-59 ml/min (H)     Non-proliferative diabetic retinopathy (H)     Proteinuria       No family history on file.    Social History     Socioeconomic History     Marital status: Single     Spouse name: Not on file     Number of children: Not on file     Years of education: Not on file     Highest education level: Not on file   Occupational History     Not on file   Social Needs     Financial resource strain: Not on file     Food insecurity:     Worry: Not on file     Inability: Not on file     Transportation needs:     Medical: Not on file     Non-medical: Not on file   Tobacco Use     Smoking status: Current Every Day Smoker     Packs/day: 0.00     Smokeless tobacco: Never Used   Substance and Sexual Activity     Alcohol use: Not on file     Drug use: Not on file     Sexual activity: Not on file   Lifestyle     Physical activity:     Days per week: Not on file     Minutes per session: Not on file     Stress: Not on file   Relationships     Social connections:     Talks on phone: Not on file     Gets together: Not on file     Attends [employer] service: Not on file     Active member of club or organization: Not on file     Attends meetings of  clubs or organizations: Not on file     Relationship status: Not on file     Intimate partner violence:     Fear of current or ex partner: Not on file     Emotionally abused: Not on file     Physically abused: Not on file     Forced sexual activity: Not on file   Other Topics Concern     Not on file   Social History Narrative     Not on file         Objective:    Vitals:    11/08/19 1514   BP: (!) 185/86   Pulse: (!) 56   Temp: 98.4  F (36.9  C)   SpO2: 99%       Gen:NAD  Throat: oropharynx clear, tonsils normal  Ears: TMs clear without effusion, ear canals normal with small cerumen  Nose: no discharge   Neck: Normal inspection, no adenopathy  CV: RRR, normal S1S2, no M, R, G  Pulm: CTAB, normal effort  Abd: normal inspection, normal bowel sounds, soft, no pain, no mass/HSM    Recent Results (from the past 24 hour(s))   Influenza A/B Rapid Test   Result Value Ref Range    Influenza  A, Rapid Antigen No Influenza A antigen detected No Influenza A antigen detected    Influenza B, Rapid Antigen No Influenza B antigen detected No Influenza B antigen detected   HM2(CBC w/o Differential)   Result Value Ref Range    WBC 8.9 4.0 - 11.0 thou/uL    RBC 4.61 4.40 - 6.20 mill/uL    Hemoglobin 14.5 14.0 - 18.0 g/dL    Hematocrit 42.9 40.0 - 54.0 %    MCV 93 80 - 100 fL    MCH 31.5 27.0 - 34.0 pg    MCHC 33.8 32.0 - 36.0 g/dL    RDW 10.7 (L) 11.0 - 14.5 %    Platelets 176 140 - 440 thou/uL    MPV 7.9 7.0 - 10.0 fL   ISTAT CHEM 7 (HE CLINIC ONLY)   Result Value Ref Range    Sodium 142 136 - 145 mmol/L    Potassium 4.0 3.5 - 5.5 mmol/L    CO2 24 22 - 31 mmol/L    Chloride 109 (H) 98 - 107 mmol/L    Anion Gap, Calculation 9 5 - 18 mmol/L    Glucose 149 (H) 70 - 125 mg/dL    BUN 25 8 - 28 mg/dL    Creatinine 2.00 (H) 0.80 - 1.50 mg/dL       CXR - ordered and reviewed by, concerning for small infiltrates at RLL, discussed during visit.     Xr Chest 2 Views    Result Date: 11/8/2019  EXAM DATE:         11/08/2019 EXAM: X-RAY CHEST, 2  VIEWS, FRONTAL AND LATERAL LOCATION: [facility] DATE/TIME: 11/8/2019 3:45 PM INDICATION: Coughing up blood. COMPARISON: None. IMPRESSION: The lungs are well inflated. There is subtle infiltrate in the right lower lobe. No pleural effusion. The heart is enlarged. There is mild interstitial edema. Kerley B lines are noted. Tortuous descending thoracic aorta. IMPRESSION: Subtle infiltrate in the right lower lobe suspicious for pneumonia with mild congestive heart failure.         Pneumonia of right lower lobe due to infectious organism (H)  -     azithromycin (ZITHROMAX Z-KAY) 250 MG tablet; Take 2 tablets (500 mg) on  Day 1,  followed by 1 tablet (250 mg) once daily on Days 2 through 5.    Cough with hemoptysis  -     XR Chest 2 Views  -     Influenza A/B Rapid Test  -     HM2(CBC w/o Differential)  -     ISTAT CHEM 7 (HE CLINIC ONLY)  -     benzonatate (TESSALON) 200 MG capsule; Take 1 capsule (200 mg total) by mouth 3 (three) times a day as needed for cough.    CKD stage 3 - creatinine near baseline. Advised good hydration.       Advised f/u with PCP next week, sooner in ER should symptoms escalate.

## 2021-06-04 VITALS
TEMPERATURE: 97.7 F | BODY MASS INDEX: 24.48 KG/M2 | RESPIRATION RATE: 14 BRPM | HEIGHT: 62 IN | HEART RATE: 60 BPM | DIASTOLIC BLOOD PRESSURE: 78 MMHG | WEIGHT: 133 LBS | SYSTOLIC BLOOD PRESSURE: 171 MMHG | OXYGEN SATURATION: 100 %

## 2021-06-04 VITALS
BODY MASS INDEX: 24.1 KG/M2 | WEIGHT: 131.75 LBS | HEART RATE: 80 BPM | SYSTOLIC BLOOD PRESSURE: 128 MMHG | DIASTOLIC BLOOD PRESSURE: 74 MMHG

## 2021-06-04 VITALS
RESPIRATION RATE: 14 BRPM | SYSTOLIC BLOOD PRESSURE: 154 MMHG | OXYGEN SATURATION: 100 % | HEART RATE: 57 BPM | WEIGHT: 131 LBS | BODY MASS INDEX: 23.96 KG/M2 | DIASTOLIC BLOOD PRESSURE: 66 MMHG

## 2021-06-04 VITALS
WEIGHT: 130 LBS | SYSTOLIC BLOOD PRESSURE: 158 MMHG | HEART RATE: 69 BPM | DIASTOLIC BLOOD PRESSURE: 74 MMHG | BODY MASS INDEX: 23.78 KG/M2

## 2021-06-04 NOTE — PROGRESS NOTES
[facility] Care Coordination Contact    Called member to discuss AVS forms. Form has been filled out and submitted.    Giulia Owens RN PHN  [facility]  [phone]

## 2021-06-04 NOTE — TELEPHONE ENCOUNTER
Medication Request  Medication name:    Disp Refills Start End    enalapril-hydrochlorothiazide 5-12.5 mg per tablet 90 tablet 0 12/6/2019 3/5/2020    Sig - Route: Take 1 tablet by mouth daily. - Oral    Sent to pharmacy as: enalapril 5 mg-hydrochlorothiazide 12.5 mg tablet    E-Prescribing Status: Receipt confirmed by pharmacy (12/6/2019 10:23 AM CST)        Pharmacy Name and Location: [facility]   Reason for request: pharmacist stated that medication is on long- term back order and was wondering if Norm Strauss MD is okay splitting the medication.  When did you use medication last?:  n/a  Patient offered appointment:  n/a  Okay to leave a detailed message: yes  [phone]

## 2021-06-04 NOTE — PROGRESS NOTES
Cc  follow up unstable Hypertension    Says on the new med  follow up chf unstable.  Denies shortness of breath or pnd  follow up pneumonia denies cough or fever     Hypertension denies chest pain or headache.  Congestive heart failure denies edema, weight gain, nighttime shortness of breath or PND      OBJECTIVE:   Vitals:    12/06/19 1015   BP: 154/66   Pulse:    Resp:    SpO2:       Eyes: non icteric, noninflamed  Lungs: no resp distress  clear  Heart: regular  Ankles: no edema  Muscles: nontender  Mental status: euthymic  Neuro: nonfocal    Body mass index is 23.96 kg/m .     cxr a month ago consistent with r lower lobe pneumonia and pulm edema    ASSESSMENT/PLAN:    1. Hypertension  enalapril-hydrochlorothiazide 5-12.5 mg per tablet   2. Other proteinuria  enalapril-hydrochlorothiazide 5-12.5 mg per tablet   3. Chronic pulmonary edema  enalapril-hydrochlorothiazide 5-12.5 mg per tablet    XR Chest 2 Views   4. Pneumonia of right lower lobe due to infectious organism (H)  XR Chest 2 Views     Problem number of new, unstable, or uncontrolled problems above (others are stable):1, 3, 4      Add Enalapril 5 mg - hydrochlorothiazide 12.5 mg  To enalapril 5 and check two wks  cxr for follow up of abnl cxr a month ago   Check two wks    Chronic issues stable/ same treatment  Current Outpatient Medications on File Prior to Visit   Medication Sig Dispense Refill     aspirin 81 MG EC tablet Take 1 tablet (81 mg total) by mouth daily. 150 tablet 2     benzonatate (TESSALON) 200 MG capsule Take 1 capsule (200 mg total) by mouth 3 (three) times a day as needed for cough. 30 capsule 0     blood glucose meter (GLUCOMETER) Use 1 each As Directed daily. Dispense glucometer brand per patient's insurance at pharmacy discretion. 1 each 0     blood glucose test (CONTOUR NEXT STRIPS) strips Use 1 each As Directed as needed. Dispense brand per patient's insurance at pharmacy discretion. 100 each 11     blood glucose test (ONETOUCH  VERIO) strips USE AS DIRECTED ONCE DAILY // 1 HNUB SIV 1 ZAUG RAWS LI QHIA 100 strip 2     blood glucose test strips Use 1 each As Directed daily. Dispense brand per patient's insurance at pharmacy discretion. 100 strip 6     blood pressure monitor Kit Dispense one digital blood pressure meter for at home use. 1 each 0     enalapril (VASOTEC) 5 MG tablet Take 1 tablet (5 mg total) by mouth daily. 30 tablet 11     generic lancets (MICROLET LANCET) Dispense brand per patient's insurance at pharmacy discretion. 100 each 11     glimepiride (AMARYL) 4 MG tablet TAKE 1 TABLET BY MOUTH ONCE DAILY IN THE MORNING // IB HNUB NOJ 1 LUB THAUM SAWV NTXOV. 90 tablet 3     ONETOUCH DELICA LANCETS 33 gauge Misc USE AS DIRECTED ONCE DAILY // 1 HNUB SIV 1 ZAUG RAWS LI QHIA 100 each 2     ONETOUCH VERIO SYSTEM Misc USE 1 EACH AS DIRECTED DAILY   SIV TXHUA HNUB RAWS LI QHIA  0     No current facility-administered medications on file prior to visit.

## 2021-06-04 NOTE — PROGRESS NOTES
follow up Hypertension    Congestive heart failure denies edema, weight gain, nighttime shortness of breath or PND  Was seen on cxr and subsequently cleared    meds not brought      At home has  Hypertension   diabetes mellitus       Ran out of enalapril 5 a week ago    Feels good.  Spends time with grandkids.  Is active.  Chasing them and changing diapers.    Hypertension denies chest pain or headache.  Diabetes denies polyuria.  Renal insufficiency denies nausea or vomiting     Proteinuria on ACE inhibitor denies cough     Pharm did not have Enalapril 5 mg - hydrochlorothiazide 12.5 mg  And was replaced with e 5 and hctz 12.5    Pt was to take two e5 and one hctz    OBJECTIVE:   Vitals:    12/20/19 1347   BP: 158/74   Pulse: 69      Eyes: non icteric, noninflamed  Lungs: no resp distress  Heart: regular  Ankles: no edema  Muscles: nontender  Mental status: euthymic  Neuro: nonfocal    Body mass index is 23.78 kg/m .     Last A1c a tad over 8  ASSESSMENT/PLAN:    1. Hypertension     2. Type 2 diabetes mellitus with stage 3 chronic kidney disease, without long-term current use of insulin (H)     3. Other proteinuria       Problem number of new, unstable, or uncontrolled problems above (others are stable): unstable 1    Pt not on regimen.  I spent 25 minutes with patient face-to-face, of which 50% or greater was spent in counseling and coordination of care in regards to patient's problems and diagnoses as listed above. Please see plan above.    Will take two enalapril 5mg   One hctz    One glimepiride   Check in two wks.    Is physically active watching 18 month old    Chronic issues stable/ same treatment  Current Outpatient Medications on File Prior to Visit   Medication Sig Dispense Refill     aspirin 81 MG EC tablet Take 1 tablet (81 mg total) by mouth daily. 150 tablet 2     benzonatate (TESSALON) 200 MG capsule Take 1 capsule (200 mg total) by mouth 3 (three) times a day as needed for cough. 30 capsule 0      blood glucose meter (GLUCOMETER) Use 1 each As Directed daily. Dispense glucometer brand per patient's insurance at pharmacy discretion. 1 each 0     blood glucose test (CONTOUR NEXT STRIPS) strips Use 1 each As Directed as needed. Dispense brand per patient's insurance at pharmacy discretion. 100 each 11     blood glucose test (ONETOUCH VERIO) strips USE AS DIRECTED ONCE DAILY // 1 HNUB SIV 1 ZAUG RAWS LI QHIA 100 strip 2     blood pressure monitor Kit Dispense one digital blood pressure meter for at home use. 1 each 0     enalapril (VASOTEC) 5 MG tablet Take 2 tablets (10 mg total) by mouth daily. 180 tablet 3     generic lancets (MICROLET LANCET) Dispense brand per patient's insurance at pharmacy discretion. 100 each 11     glimepiride (AMARYL) 4 MG tablet TAKE 1 TABLET BY MOUTH ONCE DAILY IN THE MORNING // IB HNUB NOJ 1 LUB THAUM SAWV NTXOV. 90 tablet 3     hydroCHLOROthiazide (MICROZIDE) 12.5 mg capsule Take 1 capsule (12.5 mg total) by mouth daily. 90 capsule 0     blood glucose test strips Use 1 each As Directed daily. Dispense brand per patient's insurance at pharmacy discretion. 100 strip 6     ONETOUCH DELICA LANCETS 33 gauge Misc USE AS DIRECTED ONCE DAILY // 1 HNUB SIV 1 ZAUG RAWS LI QHIA 100 each 2     ONETOUCH VERIO SYSTEM Misc USE 1 EACH AS DIRECTED DAILY   SIV TXHUA HNUB RAWS LI QHIA  0     No current facility-administered medications on file prior to visit.

## 2021-06-05 VITALS
WEIGHT: 131 LBS | DIASTOLIC BLOOD PRESSURE: 72 MMHG | BODY MASS INDEX: 23.96 KG/M2 | HEART RATE: 64 BPM | SYSTOLIC BLOOD PRESSURE: 136 MMHG

## 2021-06-05 NOTE — PROGRESS NOTES
[facility] Care Coordination Contact    Called member to schedule annual HRA home visit. Left a message requesting a return call to schedule HRA.   Giulia Owens RN N  [facility]  [phone]    1/13/20  Called member to schedule annual HRA home visit. Left a message requesting a return call to schedule HRA.   Giulia Owens RN N  [facility]  [phone]    1/15/20  Called member to schedule annual HRA home visit. Left a message requesting a return call to schedule HRA.   Giulia Owens RN N  [facility]  [phone]    Will mail letter

## 2021-06-05 NOTE — PROGRESS NOTES
Sugar 190-200      Ran out glimepiride two wks ago   (refills still available)  Asa/d  enala 5    Two /d    hctz 12.5/d    Hypertension denies chest pain or headache.  Diabetes denies polyuria.  Renal insufficiency denies nausea or vomiting   Proteinuria on ACE inhibitor denies cough     OBJECTIVE:   Vitals:    01/24/20 1032   BP: 128/74   Pulse: 80      Eyes: non icteric, noninflamed  Lungs: no resp distress  Heart: regular  Ankles: no edema  Muscles: nontender  Mental status: euthymic  Neuro: nonfocal    Body mass index is 24.1 kg/m .     ASSESSMENT/PLAN:    1. CKD (chronic kidney disease) stage 3, GFR 30-59 ml/min (H)     2. Hypertension     3. Type 2 diabetes mellitus with microalbuminuria, without long-term current use of insulin (H)     diabetes mellitus ran out of meds though refillable  Recent change Hypertension meds.   At goal  Resume meds and check 8 wks.    Chronic issues stable/ same treatment  Current Outpatient Medications on File Prior to Visit   Medication Sig Dispense Refill     aspirin 81 MG EC tablet Take 1 tablet (81 mg total) by mouth daily. 150 tablet 2     benzonatate (TESSALON) 200 MG capsule Take 1 capsule (200 mg total) by mouth 3 (three) times a day as needed for cough. 30 capsule 0     blood glucose meter (GLUCOMETER) Use 1 each As Directed daily. Dispense glucometer brand per patient's insurance at pharmacy discretion. 1 each 0     blood glucose test (CONTOUR NEXT STRIPS) strips Use 1 each As Directed as needed. Dispense brand per patient's insurance at pharmacy discretion. 100 each 11     blood glucose test (ONETOUCH VERIO) strips USE AS DIRECTED ONCE DAILY // 1 HNUB SIV 1 GLADIS NEVAREZ QHIA 100 strip 2     blood pressure monitor Kit Dispense one digital blood pressure meter for at home use. 1 each 0     enalapril (VASOTEC) 5 MG tablet Take 2 tablets (10 mg total) by mouth daily. 180 tablet 3     generic lancets (MICROLET LANCET) Dispense brand per patient's insurance at pharmacy  discretion. 100 each 11     glimepiride (AMARYL) 4 MG tablet TAKE 1 TABLET BY MOUTH ONCE DAILY IN THE MORNING // IB HNUB NOJ 1 LUB THAUM SAWV NTXOV. 90 tablet 3     hydroCHLOROthiazide (MICROZIDE) 12.5 mg capsule Take 1 capsule (12.5 mg total) by mouth daily. 90 capsule 0     ONETOUCH DELICA LANCETS 33 gauge Misc USE AS DIRECTED ONCE DAILY // 1 HNUB SIV 1 ZAJOSE RAWS LI QHIA 100 each 2     No current facility-administered medications on file prior to visit.

## 2021-06-05 NOTE — PROGRESS NOTES
"[facility] Care Coordination Contact    Per CC, mailed client an \"Unable to Contact\" letter.    Trent Cancino  Care Management Specialist  [facility]  [phone]        "

## 2021-06-07 NOTE — TELEPHONE ENCOUNTER
----- Message from Norm Strauss MD sent at 3/30/2020  3:04 PM CDT -----  Please call patient.  Tell patient:number show worsening diabetes.  I sent pioglitazone 15mg daily.  Phone visit five wks.    Other labs stable.

## 2021-06-07 NOTE — TELEPHONE ENCOUNTER
Called and spoke with Amrita Esqueda's daughter, Pedro (on CTC), Message was given, Amrita Esqueda's daughter understood, no further questions. Scheduled a telephone visit for patient on 05/07/2020 @ 10am.

## 2021-06-07 NOTE — PROGRESS NOTES
"Amrita Esqueda is a 79 y.o. male who is being evaluated via a billable telephone visit.      The patient has been notified of following:     \"This telephone visit will be conducted via a call between you and your physician/provider. We have found that certain health care needs can be provided without the need for a physical exam.  This service lets us provide the care you need with a short phone conversation.  If a prescription is necessary we can send it directly to your pharmacy.  If lab work is needed we can place an order for that and you can then stop by our lab to have the test done at a later time.    If during the course of the call the physician/provider feels a telephone visit is not appropriate, you will not be charged for this service.\"         Amrita Esqueda complains of    Chief Complaint   Patient presents with     Follow-up       I have reviewed and updated the patient's Past Medical History, Social History, Family History and Medication List.    ALLERGIES  Patient has no known allergies.    In-house  Rosas Esqueda was used for the telephone visit.    Additional provider notes: below    Phone call duration:  13 minutes    11:05 AM  diabetes mellitus med/d  Heart med /d  ?kidney /d   Hypertension/d    Hypertension denies chest pain or headache.  Diabetes denies polyuria.  Renal insufficiency denies nausea or vomiting  ASSESSMENT/PLAN:    follow up stable chronic/  Future Labs and follow up July   Future labs ordered.    Educated on social distancing     1. Hypertension  Comprehensive Metabolic Panel   2. Type 2 diabetes mellitus with diabetic nephropathy, without long-term current use of insulin (H)  Glycosylated Hemoglobin A1c   3. CKD (chronic kidney disease) stage 3, GFR 30-59 ml/min (H)  HM1(CBC and Differential)    Comprehensive Metabolic Panel   4. Other proteinuria  Comprehensive Metabolic Panel     Chronic issues stable/ same treatment  Current Outpatient Medications on File Prior to " Visit   Medication Sig Dispense Refill     aspirin 81 MG EC tablet Take 1 tablet (81 mg total) by mouth daily. 150 tablet 2     benzonatate (TESSALON) 200 MG capsule Take 1 capsule (200 mg total) by mouth 3 (three) times a day as needed for cough. 30 capsule 0     blood glucose meter (GLUCOMETER) Use 1 each As Directed daily. Dispense glucometer brand per patient's insurance at pharmacy discretion. 1 each 0     blood glucose test (CONTOUR NEXT STRIPS) strips Use 1 each As Directed as needed. Dispense brand per patient's insurance at pharmacy discretion. 100 each 11     blood glucose test (ONETOUCH VERIO) strips USE AS DIRECTED ONCE DAILY // 1 HNUB SIV 1 ZAUG RAWS LI QHIA 100 strip 2     blood pressure monitor Kit Dispense one digital blood pressure meter for at home use. 1 each 0     enalapril (VASOTEC) 5 MG tablet Take 2 tablets (10 mg total) by mouth daily. 180 tablet 3     generic lancets (MICROLET LANCET) Dispense brand per patient's insurance at pharmacy discretion. 100 each 11     glimepiride (AMARYL) 4 MG tablet TAKE 1 TABLET BY MOUTH ONCE DAILY IN THE MORNING // IB HNUB NOJ 1 LUB THAUM SAWV NTXOV. 90 tablet 3     ONETOUCH DELICA LANCETS 33 gauge Misc USE AS DIRECTED ONCE DAILY // 1 HNUB SIV 1 ZAUG RAWS LI QHIA 100 each 2     hydroCHLOROthiazide (MICROZIDE) 12.5 mg capsule Take 1 capsule (12.5 mg total) by mouth daily. 90 capsule 0     No current facility-administered medications on file prior to visit.         No sxs  follow up  Treadmill  30-60 minutes  Pretty fast     Has been active.  Appetite   No n or emesis  Sleep is good.  Sleeps flat.  One pillow.

## 2021-06-07 NOTE — TELEPHONE ENCOUNTER
"Called and left message for pt to return call.# 2 Use  line to contact pt  \" Okay to relay message\"      "

## 2021-06-07 NOTE — TELEPHONE ENCOUNTER
Requested Prescriptions     Pending Prescriptions Disp Refills     hydroCHLOROthiazide (MICROZIDE) 12.5 mg capsule [Pharmacy Med Name: HYDROCHLOROTHIAZIDE 12.5 MG 12.5 CAP] 90 capsule 0     Sig: TAKE 1 CAPSULE DAILY BY MOUTH FOR HIGH BLOOD PRESSURE // IB HNUB NOJ 1 JONI DUMONT RAU NTSHAV RITESHB       Bridget Browne

## 2021-06-08 NOTE — TELEPHONE ENCOUNTER
----- Message from Norm Strauss MD sent at 5/8/2020  4:41 PM CDT -----  Please call patient.  Tell patient:  Labs are stable.  The diabetes mellitus is a little high but is less important at his age.  The most concerning is the bp.   I sent new med    Combo of amlodipine and benazepril.   Take daily.  I think he as upcoming visit to discuss further.

## 2021-06-08 NOTE — PROGRESS NOTES
"Amrita Esqueda is a 79 y.o. male who is being evaluated via a billable telephone visit.      The patient has been notified of following:     \"This telephone visit will be conducted via a call between you and your physician/provider. We have found that certain health care needs can be provided without the need for a physical exam.  This service lets us provide the care you need with a short phone conversation.  If a prescription is necessary we can send it directly to your pharmacy.  If lab work is needed we can place an order for that and you can then stop by our lab to have the test done at a later time.    Telephone visits are billed at different rates depending on your insurance coverage. During this emergency period, for some insurers they may be billed the same as an in-person visit.  Please reach out to your insurance provider with any questions.    If during the course of the call the physician/provider feels a telephone visit is not appropriate, you will not be charged for this service.\"    Patient has given verbal consent to a Telephone visit? Yes    What phone number would you like to be contacted at? [phone]    Patient would like to receive their AVS by AVS Preference: Mail a copy.     [facility] : Tye Wu provider notes:     Assessment/Plan:        Phone call duration:  27 minutes    Carolyn Armstrong CMA  10:20 AM   Good    follow up as poor diabetes mellitus control.  meds changed.    Per daughter in law.  Enalapril 5  Two daily.   Does takes them regularly   The bottle says march 30.   A lot of pills left.  Pt states when taken sugars are low so only takes once a week.    In past a med caused fatigue and heart pound.    Glimepiride 4.  April dispensed   Pt states takes daily    pioglit  Taken daily    hctz taken once a week.    Outside.  [place].  Projects.  covid fears.   Quarantine.      awtouchpoint downloaded    Diabetes denies polyuria.  Renal insufficiency denies nausea or " vomiting  Proteinuria on ACE inhibitor denies cough    Ros   Denies fever  No cough  No shortness of breath  ASSESSMENT/PLAN:    On glimep 4/d    Pioglitazone 15/d   Check A1c   follow up per result    Dx Hypertension and proteinuria    Has enalapril and hctz but complains of side effects and takes weekly.   Check bmp vitals    And follow up per results    covid precautions    A W touchpoint downloaded with daughter in law's help.    1. Type 2 diabetes mellitus with diabetic nephropathy, without long-term current use of insulin (H)  Glycosylated Hemoglobin A1c   2. Other proteinuria  Basic Metabolic Panel   3. CKD (chronic kidney disease) stage 3, GFR 30-59 ml/min (H)  Basic Metabolic Panel    HM2(CBC w/o Differential)       Chronic issues stable/ same treatment  Current Outpatient Medications on File Prior to Visit   Medication Sig Dispense Refill     aspirin 81 MG EC tablet Take 1 tablet (81 mg total) by mouth daily. 150 tablet 2     benzonatate (TESSALON) 200 MG capsule Take 1 capsule (200 mg total) by mouth 3 (three) times a day as needed for cough. 30 capsule 0     blood glucose meter (GLUCOMETER) Use 1 each As Directed daily. Dispense glucometer brand per patient's insurance at pharmacy discretion. 1 each 0     blood glucose test (CONTOUR NEXT STRIPS) strips Use 1 each As Directed as needed. Dispense brand per patient's insurance at pharmacy discretion. 100 each 11     blood glucose test (ONETOUCH VERIO) strips USE AS DIRECTED ONCE DAILY // 1 HNUB SIV 1 GLADIS MATOS GERI QHIA 100 strip 2     blood pressure monitor Kit Dispense one digital blood pressure meter for at home use. 1 each 0     enalapril (VASOTEC) 5 MG tablet Take 2 tablets (10 mg total) by mouth daily. 180 tablet 3     generic lancets (MICROLET LANCET) Dispense brand per patient's insurance at pharmacy discretion. 100 each 11     glimepiride (AMARYL) 4 MG tablet TAKE 1 TABLET BY MOUTH ONCE DAILY IN THE MORNING // IB HNUB NOJ 1 JONI ALONSO NTXOV. 90  tablet 3     hydroCHLOROthiazide (MICROZIDE) 12.5 mg capsule TAKE 1 CAPSULE DAILY BY MOUTH FOR HIGH BLOOD PRESSURE // IB HNUB NOJ 1 LUB PAB MARIE NTSHAV SIAB 90 capsule 0     ONETOUCH DELICA LANCETS 33 gauge Misc USE AS DIRECTED ONCE DAILY // 1 HNUB SIV 1 ZAUG RAWS LI QHIA 100 each 2     pioglitazone (ACTOS) 15 MG tablet Take 1 tablet (15 mg total) by mouth daily. 90 tablet 0     No current facility-administered medications on file prior to visit.         10:47 AM             .

## 2021-06-08 NOTE — PROGRESS NOTES
"Amrita Esqueda is a 79 y.o. male who is being evaluated via a billable telephone visit.      The patient has been notified of following:     \"This telephone visit will be conducted via a call between you and your physician/provider. We have found that certain health care needs can be provided without the need for a physical exam.  This service lets us provide the care you need with a short phone conversation.  If a prescription is necessary we can send it directly to your pharmacy.  If lab work is needed we can place an order for that and you can then stop by our lab to have the test done at a later time.    Telephone visits are billed at different rates depending on your insurance coverage. During this emergency period, for some insurers they may be billed the same as an in-person visit.  Please reach out to your insurance provider with any questions.    If during the course of the call the physician/provider feels a telephone visit is not appropriate, you will not be charged for this service.\"    Patient has given verbal consent to a Telephone visit? Yes    What phone number would you like to be contacted at? [phone]    Patient would like to receive their AVS by AVS Preference: Mail a copy.     Use  line to contact :Serene ID:[id number]    Additional provider notes:     Assessment/Plan:        Phone call duration:  19 minutes    Carolyn Armstrong CMA     8:42 AM     States taking Hypertension med daily.  Cannot name.    All are once per day.  All in the morning.  White  Yellow  Green   Blue    No sxs  Night urine zero.    Less exercise due to covid.  Does own adls.  No issues.  No problems with stairs.  120-130 systolic at home.  Declines visit.    diabetes mellitus 130s 730 daily.    Diabetes denies polyuria.  Hypertension denies chest pain or headache.  Renal insufficiency denies nausea or vomiting    ASSESSMENT/PLAN:    Continue same.  Chronic issues.  bp controlled.      A1c low 9 range.  Pt 78yo " without sxs.   Increase Life style modification for exercise and diet.      Declines follow up visit and bp check and labs.    1. Type 2 diabetes mellitus with diabetic nephropathy, without long-term current use of insulin (H)     2. Hypertension     3. CKD (chronic kidney disease) stage 3, GFR 30-59 ml/min (H)       Problem number of new, unstable, or uncontrolled problems above (others are stable):  Chronic issues stable/ same treatment  Current Outpatient Medications on File Prior to Visit   Medication Sig Dispense Refill     amLODIPine-benazepril (LOTREL) 5-10 mg per capsule Take 1 capsule by mouth daily. 30 capsule 11     aspirin 81 MG EC tablet Take 1 tablet (81 mg total) by mouth daily. 150 tablet 2     blood glucose meter (GLUCOMETER) Use 1 each As Directed daily. Dispense glucometer brand per patient's insurance at pharmacy discretion. 1 each 0     blood glucose test (CONTOUR NEXT STRIPS) strips Use 1 each As Directed as needed. Dispense brand per patient's insurance at pharmacy discretion. 100 each 11     blood glucose test (ONETOUCH VERIO) strips USE AS DIRECTED ONCE DAILY // 1 HNUB SIV 1 ZAUG RAWS LI QHIA 100 strip 2     blood pressure monitor Kit Dispense one digital blood pressure meter for at home use. 1 each 0     enalapril (VASOTEC) 5 MG tablet Take 2 tablets (10 mg total) by mouth daily. 180 tablet 3     generic lancets (MICROLET LANCET) Dispense brand per patient's insurance at pharmacy discretion. 100 each 11     glimepiride (AMARYL) 4 MG tablet TAKE 1 TABLET BY MOUTH ONCE DAILY IN THE MORNING // IB HNUB NOJ 1 LUB THAUM SAWV NTXOV. 90 tablet 3     hydroCHLOROthiazide (MICROZIDE) 12.5 mg capsule TAKE 1 CAPSULE DAILY BY MOUTH FOR HIGH BLOOD PRESSURE // IB HNUB NOJ 1 LUB PAB MARIE NTSHAV SIAB 90 capsule 0     ONETOUCH DELICA LANCETS 33 gauge Misc USE AS DIRECTED ONCE DAILY // 1 HNUB SIV 1 ZAUG RAWS LI QHIA 100 each 2     pioglitazone (ACTOS) 15 MG tablet Take 1 tablet (15 mg total) by mouth daily. 90  tablet 0     benzonatate (TESSALON) 200 MG capsule Take 1 capsule (200 mg total) by mouth 3 (three) times a day as needed for cough. 30 capsule 0     No current facility-administered medications on file prior to visit.       9:01 AM

## 2021-06-09 ENCOUNTER — RECORDS - HEALTHEAST (OUTPATIENT)
Dept: ADMINISTRATIVE | Facility: OTHER | Age: 81
End: 2021-06-09

## 2021-06-09 DIAGNOSIS — I10 ESSENTIAL HYPERTENSION: ICD-10-CM

## 2021-06-09 RX ORDER — AMLODIPINE BESYLATE 5 MG/1
5 TABLET ORAL DAILY
Qty: 30 TABLET | Refills: 11 | Status: SHIPPED | OUTPATIENT
Start: 2021-06-09 | End: 2021-09-23

## 2021-06-09 NOTE — PROGRESS NOTES
[facility] Care Coordination Contact  [facility] Home Visit Assessment     Home visit for Health Risk Assessment with Amrita Esqueda completed on 7/24/2020 via phone d/t COVID-19 restrictions.    Type of residence:: Private home - stairs  Current living arrangement:: I live in a private home with family     Assessment completed with:: Patient    Current Care Plan  Member currently receiving the following home care services:     Member currently receiving the following community resources: None      Medication Review  Medication reconciliation completed in Epic: IF NO, PLEASE EXPLAIN declined  Medication set-up & administration: Independent-does not set up  Self-administers medications  Medication Risk Assessment Medication (1 or more, place referral to MTM):  N/A: No risk factors identified  MTM Referral Placed: No: No risk factors idenified    Mental/Behavioral Health   Depression Screening:    PHQ-2 Total Score: 0       Mental health DX:: No        Falls Assessment:   Fallen 2 or more times in the past year?: No   Any fall with injury in the past year?: No    ADL/IADL Dependencies:   Dependent ADLs:: Independent  Dependent IADLs:: Independent    [facility] sponsored benefits: Shared information re: Silver Sneakers/gym memberships, ASA, Calcium +D.    PCA Assessment completed at visit: No     Elderly Waiver Eligibility: No - does not meet criteria    Care Plan & Recommendations: Albert will continue to live independently in the community.    See [facility] for detailed assessment information.    Follow-Up Plan: Member informed of future contact, plan to f/u with member with a 6 month telephone assessment.  Contact information shared with member and family, encouraged member to call with any questions or concerns at any time.    [facility] care continuum providers: Please refer to Health Care Home on the [facility] Problem List to view this patient's [facility] Care Plan Summary.    Giulai Owens RN  PHN  [facility]  [phone]

## 2021-06-10 NOTE — TELEPHONE ENCOUNTER
Called and left detailed message. Okay to schedule appointment upon return call      ----- Message -----  From: Tyler Camargo MD  Sent: 6/9/2020   1:07 PM CDT  To: [facility] Family Medicine/Ob Support Pool    Please set up Annual Wellness visit virtual visit.

## 2021-06-10 NOTE — PROGRESS NOTES
[facility] Care Coordination Contact    Received after visit chart from care coordinator.  Completed following tasks: Mailed copy of care plan to client     Mailed POC signature page and  SHAYE signature page to member to sign and return asap.    Trent Cancino  Care Management Specialist  [facility]  [phone]

## 2021-06-10 NOTE — PROGRESS NOTES
New pt  Sore throat two days.  No exposure.   Sore on left.  Fever, chills, two days.  Better today still bothering.  No rash.  No sob.  No sinus or ear pain.    Ph: Hypertension   diabetes mellitus     No sx when ran out of meds    Was told he had diabetes mellitus and Hypertension at a regular  visit and given option to start meds.  He was on meds for a while then off.    Unknown Hyperlipidemia     Hx meds but not on for over 3 years    hosp surg neg  Med neg  Allergies neg  hab neg minimal etoh  Fhsh: citizen years.    .   Here with daughter in law.  Yard and house work.  Can do physical work without issues.      ROS:  Constitutional: denies fever.   No wt change  Vision: denies change in vision  ENT: denies cough  Resp: denies shortness of breath  Card: denies palpitations  GI: denies vomiting or abnormal stool, melena, hematochezia  : denies dysuria  Neuro: denies numbness or weakness  Derm: denies rash  Joints: denies redness or swelling  Endo: denies polyuria  Mental health: mood is good  Extremities: no edema  Otherwise negative review of systems     ROS: as noted above    OBJECTIVE:   Vitals:    05/22/17 1344   BP: 138/76   Pulse: 76   Resp: 20   Temp: 98.2  F (36.8  C)      Head: atraumatic   Eyes: nl eom, anicteric   Ears: nl external ears   Neck: nl nodes, supple   Lungs: clear to ausc   Heart: regular rhythm  Back: no tenderness  Abd: soft nontender   Joints: uninflamed   Ext: nontender calves   Mental: euthymic  Neuro: no weakness  Gait: normal  Rapid strep.  Neg today    ASSESSMENT/PLAN:    1. Sore throat  Rapid Strep A Screen-Throat    Group A Strep, RNA Direct Detection, Throat   2. Type 2 diabetes mellitus without complication, without long-term current use of insulin  LDL Cholesterol, Direct    Glycosylated Hemoglobin A1c   3. High blood pressure  LDL Cholesterol, Direct     Sx tx  Check parameters off meds to substantiate diagnoses and follow up per result.

## 2021-06-11 NOTE — PROGRESS NOTES
"Subjective:  76 y.o. male with concerns of follow up on labs.  Has DMII that is not currently treated.  Has CKD-III with GFR 36, so metformin not recommended.  Prefers oral medicine to injections.  Feels fine.  Good energy.  No polydipsia or polyuria.    LDL high as well.  Statin indication not clear with age >75.     History   Smoking Status     Never Smoker   Smokeless Tobacco     Not on file      Objective:  /64 (Patient Site: Right Arm, Patient Position: Sitting, Cuff Size: Adult Regular)  Pulse 60  Temp 97.6  F (36.4  C) (Oral)   Resp 20  Ht 5' 2\" (1.575 m)  Wt 130 lb (59 kg)  BMI 23.78 kg/m2  GENERAL: alert, not distressed  CHEST: clear, no rales, rhonchi, or wheezes  CARDIAC: regular without murmur  ABDOMEN: soft, non tender, non distended, normal bowel sounds     Lab Results   Component Value Date    HGBA1C 9.4 (H) 05/22/2017     Assessment and Plan:   1. Type 2 diabetes mellitus with kidney complication, without long-term current use of insulin  Would resta  - glimepiride (AMARYL) 2 MG tablet; Take 1 tablet (2 mg total) by mouth every morning.  Dispense: 60 tablet; Refill: 1  - Microalbumin, Random Urine  - Pneumococcal conjugate vaccine 13-valent 6wks-17yrs; >50yrs  - blood pressure monitor Kit; Dispense one digital blood pressure meter for at home use.  Dispense: 1 each; Refill: 0  - generic lancets (MICROLET LANCET); Dispense brand per patient's insurance at pharmacy discretion.  Dispense: 100 each; Refill: 11  - blood glucose test (CONTOUR NEXT STRIPS) strips; Use 1 each As Directed as needed. Dispense brand per patient's insurance at pharmacy discretion.  Dispense: 100 each; Refill: 11    2. Essential hypertension with goal blood pressure less than 140/90  Controlled now off meds.  Check to see if has microalbuminuria.  Might start low dose ACEi if present.    Follow up 2-3 months for recheck.    This visit was conducted with the aid of a professional .   "

## 2021-06-14 NOTE — TELEPHONE ENCOUNTER
Attempt call 3. Unable to leave message - no answering machine. Okay to relay message.    Letter okay?

## 2021-06-14 NOTE — PROGRESS NOTES
Subjective:  80 y.o. male with concerns of DM follow up.  Says he is taking two meds.  One for BP and one for DM  List has 6 meds and two ACEis  Feels well  No polyuria or polydipsia.    Checking bg in and .    No HA or vision changes.  Was shoveling snow this AM and feels good.    Outpatient Medications Prior to Visit   Medication Sig Dispense Refill     amLODIPine-benazepril (LOTREL) 5-10 mg per capsule Take 1 capsule by mouth daily. 30 capsule 11     aspirin 81 MG EC tablet Take 1 tablet (81 mg total) by mouth daily. 150 tablet 2     blood glucose meter (GLUCOMETER) Use 1 each As Directed daily. Dispense glucometer brand per patient's insurance at pharmacy discretion. 1 each 0     blood glucose test (CONTOUR NEXT STRIPS) strips Use 1 each As Directed as needed. Dispense brand per patient's insurance at pharmacy discretion. 100 each 11     blood glucose test (ONETOUCH VERIO) strips USE AS DIRECTED ONCE DAILY // 1 HNUB SIV 1 ZAUG RAWS LI QHIA 100 strip 2     blood pressure monitor Kit Dispense one digital blood pressure meter for at home use. 1 each 0     enalapril (VASOTEC) 5 MG tablet Take 2 tablets (10 mg total) by mouth daily. 180 tablet 3     generic lancets (MICROLET LANCET) Dispense brand per patient's insurance at pharmacy discretion. 100 each 11     glimepiride (AMARYL) 4 MG tablet TAKE 1 TABLET BY MOUTH ONCE DAILY IN THE MORNING // IB HNUB NOJ 1 LUB THAUM SAWV NTXOV. 90 tablet 0     hydroCHLOROthiazide (MICROZIDE) 12.5 mg capsule TAKE 1 CAPSULE DAILY BY MOUTH FOR HIGH BLOOD PRESSURE // IB HNUB NOJ 1 LUB PAB MARIE NTSHAV SIAB 90 capsule 0     ONETOUCH DELICA LANCETS 33 gauge Misc USE AS DIRECTED ONCE DAILY // 1 HNUB SIV 1 ZAUG RAWS LI QHIA 100 each 2     pioglitazone (ACTOS) 15 MG tablet Take 1 tablet (15 mg total) by mouth daily. 90 tablet 0     benzonatate (TESSALON) 200 MG capsule Take 1 capsule (200 mg total) by mouth 3 (three) times a day as needed for cough. 30 capsule 0     No  facility-administered medications prior to visit.       Social History     Tobacco Use   Smoking Status Former Smoker     Packs/day: 0.00   Smokeless Tobacco Never Used   Tobacco Comment    QUIT 5-6 YEARS AGO      Objective:  /72 (Patient Site: Left Arm, Patient Position: Sitting, Cuff Size: Adult Regular)   Pulse 64   Wt 131 lb (59.4 kg)   BMI 23.96 kg/m    GENERAL: alert, not distressed  CHEST: clear, no rales, rhonchi, or wheezes  CARDIAC: regular without murmur, gallop, or rub  ABDOMEN: soft, non tender, non distended, normal bowel sounds    Recent Results (from the past 24 hour(s))   Glycosylated Hemoglobin A1c   Result Value Ref Range    Hemoglobin A1c 6.8 (H) <=5.6 %        Assessment and Plan:   DM and BP controlled.  Not sure what meds he is on.  Will check with pharmacy.    1. Type 2 diabetes mellitus with diabetic nephropathy, without long-term current use of insulin (H)  - ALT (SGPT)  - Glycosylated Hemoglobin A1c  - Basic Metabolic Panel    2. Stage 3b chronic kidney disease  - Lipid [place] FASTING  - Basic Metabolic Panel  - Microalbumin, Random Urine

## 2021-06-14 NOTE — TELEPHONE ENCOUNTER
Requested Prescriptions     Pending Prescriptions Disp Refills     ONETOUCH DELICA LANCETS 33 gauge Misc [Pharmacy Med Name: ONETOUCH DELICA 33G LANCETS Miscellaneous] 100 each 2     Sig: USE AS DIRECTED ONCE DAILY // 1 HNUB SIV 1 GLADIS CROOKS     ONETOUCH VERIO TEST STRIPS strips [Pharmacy Med Name: ONETOUCH VERIO STRP Strip] 100 strip 2     Sig: USE AS DIRECTED ONCE DAILY // 1 HNUB SIV 1 GLADIS SOLORIOA

## 2021-06-14 NOTE — TELEPHONE ENCOUNTER
----- Message from Tyler Camargo MD sent at 12/27/2020  5:22 PM CST -----  Call:  Pharmacy confirmed two medicines.  I think they should be   1.amlodipine-benazeprill   and   2. glimepiride    I refilled these because your BP is good and your diabetes control is good  You should stay on these.

## 2021-06-15 NOTE — TELEPHONE ENCOUNTER
Refill Approved    Rx renewed per Medication Renewal Policy. Medication was last renewed on 11/15/19.    Lloyd Valencia, [facility] Triage/Med Refill 3/3/2021     Requested Prescriptions   Pending Prescriptions Disp Refills     aspirin 81 MG EC tablet [Pharmacy Med Name: ASPIRIN ADULT LOW STRENGTH 81 Tablet] 90 tablet 2     Sig: TAKE 1 TABLET BY MOUTH DAILY FOR STROKE PREVENTION // IB HNUB NOJ 1 LUB PAB KOM NTSHAV KHIAV ZOO       Aspirin/Dipyridamole Refill Protocol Passed - 3/2/2021  1:16 PM        Passed - PCP or prescribing provider visit in past 12 months       Last office visit with prescriber/PCP: 1/24/2020 Norm Strauss MD OR same dept: 12/24/2020 Tyler Camargo MD OR same specialty: 12/24/2020 Tyler Camargo MD  Last physical: 6/7/2018 Last MTM visit: Visit date not found    Next appt within 3 mo: Visit date not found Next physical within 3 mo: Visit date not found  Prescriber OR PCP: Norm Strauss MD  Last diagnosis associated with med order: 1. Type 2 diabetes mellitus with diabetic nephropathy, without long-term current use of insulin (H)  - aspirin 81 MG EC tablet [Pharmacy Med Name: ASPIRIN ADULT LOW STRENGTH 81 Tablet]; TAKE 1 TABLET BY MOUTH DAILY FOR STROKE PREVENTION // IB HNUB NOJ 1 LUB PAB KOM NTSHAV KHIAV ZOO  Dispense: 90 tablet; Refill: 2    If protocol passes may refill for 6 months if within 3 months of last provider visit (or a total of 9 months).

## 2021-06-15 NOTE — PROGRESS NOTES
[facility] Care Coordination      [facility] Six-Month Telephone Assessment    6 month telephone assessment completed on 2/11/21.    ER visits: No  Hospitalizations: No  TCU stays: No  Significant health status changes: none  Falls/Injuries: No  ADL/IADL changes: No  Changes in services: No    Caregiver Assessment follow up:  No change    Goals: See POC in chart for goal progress documentation.      Will see member in 6 months for an annual health risk assessment.   Encouraged member to call CC with any questions or concerns in the meantime.     Care coordinator scheduled member for Covid-19 vaccine for Tuesday, 2/16/21 at 0900 at [place] location. Informed to arrive 15min. Earlier.    Giulia Owens RN PHN  [facility]  [phone]

## 2021-06-16 PROBLEM — E11.3299 NON-PROLIFERATIVE DIABETIC RETINOPATHY (H): Status: ACTIVE | Noted: 2018-07-13

## 2021-06-16 PROBLEM — N18.30 CKD (CHRONIC KIDNEY DISEASE) STAGE 3, GFR 30-59 ML/MIN (H): Status: ACTIVE | Noted: 2017-06-23

## 2021-06-16 PROBLEM — R80.9 PROTEINURIA: Status: ACTIVE | Noted: 2018-07-20

## 2021-06-17 NOTE — PATIENT INSTRUCTIONS - HE
Patient Instructions by Steve Haynes MD at 11/8/2019  2:40 PM     Author: Steve Haynes MD Service: -- Author Type: Physician    Filed: 11/8/2019  4:11 PM Encounter Date: 11/8/2019 Status: Addendum    : Steve Haynes MD (Physician)    Related Notes: Original Note by Steve Haynes MD (Physician) filed at 11/8/2019  4:07 PM       Go to the Emergency Department if your symptoms worsen.   Patient Education     When You Have Pneumonia  You have been diagnosed with pneumonia. This is a serious lung infection. Most cases of pneumonia are caused by bacteria. But viruses, mycoplasmas, and fungi can also cause pneumonia. So can inhaling certain chemicals. Pneumonia most often occurs in older adults, young children, and people with chronic health problems.  Home care    Take your medicine exactly as directed. Dont skip doses. Continue taking your antibiotics as directed until they are all gone, even if you start to feel better. This will prevent the pneumonia from coming back.    Drink at least 8 glasses of water daily, unless directed otherwise. This helps to loosen and thin lung secretions so that you can cough them up.    Use a cool-mist humidifier in your bedroom. Be sure to clean the humidifier daily.    Dont use medicines to suppress your cough unless your cough is dry, painful, or interferes with your sleep. Coughing up mucus is normal and helps you recover. You may use an expectorant if your healthcare provider says its OK.    You can use warm compresses or a heating pad on the lowest setting to relieve chest discomfort. Use several times a day for 15 to 20 minutes at a time. To prevent injury to your skin, set the temperature to warm, not hot. Dont put the compress or pad directly on your skin. Make certain it has a cover or wrap it in a towel. This is to prevent skin burns.    Get plenty of rest until your fever, shortness of breath, and chest pain go away.    Plan to get a flu shot every  year. The flu is a common cause of pneumonia. Getting a flu shot every year can help prevent both the flu and pneumonia.    Getting the pneumococcal vaccine  Talk with your healthcare provider about getting the pneumococcal vaccine. There are 2 different pneumonia vaccines. You may need to get both vaccines. Pneumococcal pneumonia is caused by bacteria that spread from person to person. It can cause minor problems, such as ear infections. But it can also turn into life-threatening illnesses of the lungs (pneumonia), the covering of the brain and spinal cord (meningitis), and the blood (bacteremia).  Children under 2 years of age, adults over age 65, people with certain health conditions, and smokers are at the highest risk of pneumococcal disease. This vaccine can help prevent pneumococcal disease in both adults and children. Some people should not have the vaccine. Make sure to ask your healthcare provider if you should have the vaccine.   Follow-up care  Make a follow-up appointment, or as directed.  When to call your healthcare provider  Call your healthcare provider right away if you have any of the following:    Fever of 100.4 F (38 C) or higher, or as directed by your healthcare provider    Mucus from the lungs (sputum) thats yellow, green, bloody, or smells bad    A large amount of sputum    Vomiting    Symptoms that get worse  Call 911  Call 911 right away if you have any of the following:    Chest pain    Trouble breathing    Blue lips or fingernails  Date Last Reviewed: 6/1/2019 2000-2019 [employer]. [address] [place] [address]. All rights reserved. This information is not intended as a substitute for professional medical care. Always follow your healthcare professional's instructions.

## 2021-06-18 NOTE — PROGRESS NOTES
Here   Annual wellness visit    Questionnaire reviewed.  Glasses at home.   He does exercise as works a one acre garden 6 hours per day    Ph: Hypertension but nl off med now  diabetes mellitus dx a year ago.   Was given rx and went there and the pharmacy said the med was not there.  Thus no follow up     Also  A1c 9   ldl 150   Cr 1.82        hosp surg neg  Med neg  Allergies neg  hab neg minimal etoh  Fhsh: citizen years.    .   Here with daughter in law.  Yard and house work.  Can do physical work without issues.        ROS:  Constitutional: denies fever.   No wt change  Vision: denies change in vision   Left side is not clear.    Eye visit four months ago.  Got glasses.  helped  ENT: denies cough  Resp: denies shortness of breath  Card: denies palpitations  GI: denies vomiting or abnormal stool, melena, hematochezia  : denies dysuria  Neuro: denies numbness or weakness  Derm: denies rash  Joints: denies redness or swelling  Endo: denies polyuria    Night urine once    Nl volume  Mental health: mood is good  Extremities: no edema  Otherwise negative review of systems     2014 one of three positive hc     Was suggested colonoscopy but declined     ROS: as noted above    OBJECTIVE:   Vitals:    06/07/18 0953   BP: 134/68   Pulse: 60   Resp: 16   Temp: 97.3  F (36.3  C)      Wt is noted.  No diaphoresis  Eyes: nl eom, anicteric   External ears, nose: nl    Neck: nl nodes, supple, thyroid normal   Lungs: clear to ausc   Heart: regular rhythm  Abd: soft nontender     No cva (renal) tenderness  Neuro: no weakness  Skin no rash  Joints: uninflamed   No ketotic breath odor noted  Mental: euthymic  Ext: nontender calves   Gait: normal  Normal feet  Bmi:22      ASSESSMENT/PLAN:   Health Maintenance/ Plan: anticipatory guidance, discussion of risk factors, lifestyle modification, and risk factor management.   coronary artery disease risk management discussed  Cancer risk management discussed.  Has aged out of  screens    Additional diagnoses and related orders:  1. Type 2 diabetes mellitus with kidney complication, without long-term current use of insulin  Glycosylated Hemoglobin A1c    Microalbumin, Random Urine    Ambulatory referral to Ophthalmology   2. Hypercholesterolemia  LDL Cholesterol, Direct   3. CKD (chronic kidney disease) stage 3, GFR 30-59 ml/min  Comprehensive Metabolic Panel    Microalbumin, Random Urine    HM2(CBC w/o Differential)   4. Hemoccult Positive Stool  HM2(CBC w/o Differential)   5. Routine general medical examination at a health care facility       follow up per lab results    Annual wellness forms addressed.  He actually gets plenty of exercise  Glasses at home    Care team  Dr Strauss only      Additional diagnoses and related orders:  1. Type 2 diabetes mellitus with kidney complication, without long-term current use of insulin  Glycosylated Hemoglobin A1c    Microalbumin, Random Urine    Ambulatory referral to Ophthalmology    glimepiride (AMARYL) 2 MG tablet   2. Hypercholesterolemia  LDL Cholesterol, Direct   3. CKD (chronic kidney disease) stage 3, GFR 30-59 ml/min  Comprehensive Metabolic Panel    Microalbumin, Random Urine    HM2(CBC w/o Differential)   4. Hemoccult Positive Stool  HM2(CBC w/o Differential)   5. Routine general medical examination at a health care facility       follow up six wks after glimepiride start

## 2021-06-19 NOTE — PROGRESS NOTES
New med.  Takes daily.    Checks sugars every 2-3 days  Feels treats sugars as high sugars cause sx      Pt is 77    Positive microalb.   Not currently on angiotensin related med  No nausea    Baseline ldl under 100 off statin    Renal insufficiency denies nausea or vomiting    Has visit with ophth for diabetes mellitus retinopathy.    Cataracts soon    ROS: as noted above    OBJECTIVE:   Vitals:    07/20/18 0839   BP: 100/74   Pulse: (!) 56   Resp: 16      Eyes: non icteric, noninflamed  Lungs: no resp distress  Heart: regular  Ankles: no edema  Muscles: nontender  Mental status: euthymic  Neuro: nonfocal    Body mass index is 23.23 kg/(m^2).   Nl feet    ASSESSMENT/PLAN:    Today A1c better but still high    Will increase to 4mg follow up 5 wks    Discussed Acei for protein, statin...   Pt declines.    1. CKD (chronic kidney disease) stage 3, GFR 30-59 ml/min     2. Type 2 diabetes mellitus with kidney complication, without long-term current use of insulin (H)  Glycosylated Hemoglobin A1c    glimepiride (AMARYL) 4 MG tablet   3. Non-proliferative diabetic retinopathy (H)     4. Proteinuria

## 2021-06-19 NOTE — LETTER
Letter by Amauri Smith MD at      Author: Amauri Smith MD Service: -- Author Type: --    Filed:  Encounter Date: 6/17/2019 Status: (Other)       .    [facility]    [facility]  [address] [place] [address]  [phone]    [facility]  [address], [address]  [place] [address]  [phone]    06/18/19            Patient: Amrita Esqueda  MR Number: [id number]  YOB: 1940  Date of Visit: 6/17/2019          Thank you for referring Amrita Esqueda to me for evaluation.  Please see attached visit note.  If you have questions, please do not hesitate to contact me.       Sincerely,    Amauri Smith MD  [facility]  [phone]  [email or web address]                    [email or web address]              Assessment:    Isolated angioedema without clear allergy trigger.  Timing of symptoms with eating makes food allergy unlikely.  He was on no medications at the time.     Plan:    We will do C4 and tryptase level.  IgE for Lamb and shrimp.  If new episodes, recommend taking pictures.  Reviewed seeking medical attention with danger signs.  No EpiPen to be prescribed at this time.  Antihistamines such as cetirizine 10 mg morning and night can be used at onset.  Follow-up in allergy clinic with any additional episodes.   ____________________________________________________________________________     Patient comes in today for evaluation of recent episode with swelling around his eyes.  This was on Aubree 10.  The day before he was at a family gathering it.  He recalls eating lamb and shrimp.  These with where the only unusual foods.  He ate them at approximately 6:00 PM.  He does not recall having symptoms that evening however upon waking the next morning he had swelling of both eyes.  He was able to see.  He recalls that his face was itchy.  He does not recall any rash.  He did go to the emergency room.  On exam he was  noted to have significant swelling around both of his eyes but otherwise normal exam.  No hypotension.  No wheezing.  Patient was given steroids, Benadryl and Pepcid.  No epinephrine was given.  Symptoms resolved completely in the emergency room.  He was discharged home.  He has not had any subsequent return of symptoms.  No previous history of similar symptoms.  No previous history of hives.  Patient was not on any medication at the onset of this.  He does not recall being sick in any way.  No recent symptoms of allergic rhinitis.  No new stresses.    Review of symptoms:  As above, otherwise negative    Past medical history: Hypertension, hypercholesterolemia, chronic kidney disease, type 2 diabetes    Allergies: No known allergies to medications, latex, foods or hymenoptera venom    Family history: No known member of the family with allergy or asthma.    Social history: Currently lives in house with central air conditioning and basement.  No visible water seepage or mold.  No pets in the home.  No cigarette smoking history.    Medications: reviewed in chart    Physical Exam:  General:  Alert and in no apparent distress.  Eyes:  Sclera clear.  Ears: TMs translucent grey with bony landmarks visible. Nose: Pale, boggy mucosal membranes.  Throat: Pink, moist.  No lesions.  Neck: Supple.  No lymphadenopathy.  Lungs: CTA.  CV: Regular rate and rhythm. Extremities: Well perfused.  No clubbing or cyanosis. Skin: No rash    Patient seen upon request of Cole Malhotra for evaluation of angioedema.

## 2021-06-19 NOTE — LETTER
Letter by Amauri Smith MD at      Author: Amauri Smith MD Service: -- Author Type: --    Filed:  Encounter Date: 6/24/2019 Status: (Other)         Amrita Esqueda  [address] [address]             June 24, 2019         Dear Mr. Esqueda,    Below are the results from your recent visit:    Resulted Orders   Tryptase   Result Value Ref Range    Tryptase 7.5 <11.0 ug/L      Comment:      Performed and/or entered by:  [address] [address]    Complement, C'4   Result Value Ref Range    C4 Complement 21 19 - 59 mg/dL   Shrimp IgE   Result Value Ref Range    Shrimp IgE <0.35 <0.35 kU/L    Narrative    ImmunoCAP Specific IgE Blood Test Quantitative Scoring                      IgE (kU/L  Level  -----------------------------------------------------------------------------    <0.35   Absent/undetectable    0.35-0.69  Low    0.70-3.49  Moderate    3.50-17.49  High    >=17.50  Very High  -----------------------------------------------------------------------------  *Please note, a high or low specific IgE level may not   necessarily correlate to the degree of symptom severity,   as each person's symptomatic threshold varies.     [place] IgE, Food Allergen   Result Value Ref Range    Allergen, Food, Lamb IgE <0.10 <=0.34 kU/L      Comment:      Performed by [facility],  [address] [place] [address] [phone]  [email or web address], Christofer Pan MD, Lab. Director     Your labwork is normal.    Please call with questions or contact us using [employer].    Sincerely,        Electronically signed by Amauri Smith MD

## 2021-06-20 NOTE — LETTER
Letter by Giulia Owens RN at      Author: Giulia Owens RN Service: -- Author Type: --    Filed:  Encounter Date: 1/8/2020 Status: Signed           January 15, 2020      AMRITA NATALY  [address] [place] [id number]        Dear Amrita:     Your Care Coordinator has been unable to reach you by telephone. I am writing to ask you or a family member to call me at [phone]. If you reach my voicemail, please leave a message with your daytime telephone number and a date and time that I can call you. If you are hearing impaired, please call the [place] Relay at [phone] or [phone] (ppfkmy-an-mjdkgj relay service).     The reason I am trying to reach you is:    [] Six (6) month check-in  [x] To schedule your annual assessment  [] Other:      Please call me as soon as you receive this letter. I look forward to speaking with you.    Sincerely,      Giulia Owens RN, PHN    E-mail: [email or web address]  [phone]      [facility] ([facility]) is a health plan that contracts with both Medicare and the [place] Medical Assistance (Medicaid) program to provide benefits of both programs to enrollees. Enrollment in [facility] depends on contract renewal.    [id number]    [id number] (11/18)

## 2021-06-20 NOTE — PROGRESS NOTES
Diabetes denies polyuria.  Hyperlipidemia on Life style modification     No card or neuro sxs      ldl less than 100 on Life style modification     bilat blur  Cataracts   sugery    Walks shantal daily     In wtr on treadmill    Watches diet.    Recent increase of glimepiride to 4mg /d    Renal insufficiency denies nausea or vomiting  Proteinuria declines med    ROS: as noted above    OBJECTIVE:   Vitals:    08/24/18 0840   BP: 112/68   Pulse: 72   Resp: 20   Temp: 98  F (36.7  C)      Wt is noted.  No diaphoresis  Eyes: nl eom, anicteric   External ears, nose: nl    Neck: nl nodes, supple, thyroid normal   Lungs: clear to ausc   Heart: regular rhythm  Abd: soft nontender     No cva (renal) tenderness  Neuro: no weakness  Skin no rash  Joints: uninflamed   No ketotic breath odor noted  Mental: euthymic  Ext: nontender calves   Gait: normal    Body mass index is 22.95 kg/(m^2).     ASSESSMENT/PLAN:    1. Hypercholesterolemia     2. Type 2 diabetes mellitus with kidney complication, without long-term current use of insulin (H)  blood glucose test strips    Glycosylated Hemoglobin A1c    glimepiride (AMARYL) 4 MG tablet   3. CKD (chronic kidney disease) stage 3, GFR 30-59 ml/min     4. Non-proliferative diabetic retinopathy (H)     5. Preoperative examination       follow up per result

## 2021-06-20 NOTE — LETTER
Letter by Norm Strauss MD at      Author: Norm Strauss MD Service: -- Author Type: --    Filed:  Encounter Date: 5/11/2020 Status: (Other)         Amrita Esqueda  [address] [facility] [address]             May 14, 2020         Dear Mr. Esqueda,    Below are the results from your recent visit:    Resulted Orders   Glycosylated Hemoglobin A1c   Result Value Ref Range    Hemoglobin A1c 9.5 (H) 3.5 - 6.0 %   Basic Metabolic Panel   Result Value Ref Range    Sodium 138 136 - 145 mmol/L    Potassium 4.2 3.5 - 5.0 mmol/L    Chloride 105 98 - 107 mmol/L    CO2 21 (L) 22 - 31 mmol/L    Anion Gap, Calculation 12 5 - 18 mmol/L    Glucose 181 (H) 70 - 125 mg/dL    Calcium 9.2 8.5 - 10.5 mg/dL    BUN 28 8 - 28 mg/dL    Creatinine 2.03 (H) 0.70 - 1.30 mg/dL    GFR MDRD Af Amer 38 (L) >60 mL/min/1.73m2    GFR MDRD Non Af Amer 32 (L) >60 mL/min/1.73m2    Narrative    Fasting Glucose reference range is 70-99 mg/dL per  American Diabetes Association (ADA) guidelines.   HM2(CBC w/o Differential)   Result Value Ref Range    WBC 9.0 4.0 - 11.0 thou/uL    RBC 4.58 4.40 - 6.20 mill/uL    Hemoglobin 14.0 14.0 - 18.0 g/dL    Hematocrit 42.5 40.0 - 54.0 %    MCV 93 80 - 100 fL    MCH 30.6 27.0 - 34.0 pg    MCHC 33.0 32.0 - 36.0 g/dL    RDW 11.7 11.0 - 14.5 %    Platelets 146 140 - 440 thou/uL    MPV 8.7 7.0 - 10.0 fL        Labs are stable.  The diabetes mellitus is a little high, but is less important at your age. The most  concerning is the blood pressure. I sent new medication combo of amlodipine and benazepril.Take  Daily. We will discuss further at your next appointment.       Please call with questions or contact us using [employer].    Sincerely,        Electronically signed by Norm Strauss MD

## 2021-06-20 NOTE — LETTER
Letter by Giulia Owens RN at      Author: Giulia Owens RN Service: -- Author Type: --    Filed:  Encounter Date: 7/28/2020 Status: (Other)       July 28, 2020      PAMELA INGRAM  [address] [place] [address]      Dear Tong-Wa:    At [facility], we are dedicated to improving your health and well-being. Enclosed is the Comprehensive Care Plan that we developed with you on 7/24/20. Please review the Care Plan carefully.    As a reminder, some of the things we discussed at your visit include:    Your physical and mental health    Ways to reduce falls    Health care needs you may have    Dont forget to contact your care coordinator if you:    Have been hospitalized or plan to be hospitalized     Have had a fall     Have experienced a change in physical health    Are experiencing emotional problems     If you do not agree with your Care Plan, have questions about it, or have experienced a change in your needs, please call me at [phone]. If you are hearing impaired, please call the [place] [employer]ay at [phone] or [phone] (gjickt-su-inskji relay service).    Sincerely,      Giulia Owens RN, PHN    E-mail: [email or web address]  [phone]      [facility] ([facility]) is a health plan that contracts with both Medicare and the [place] Medical Assistance (Medicaid) program to provide benefits of both programs to enrollees. Enrollment in [facility] depends on contract renewal.    [id number]     [id number] (11/18)

## 2021-06-21 NOTE — LETTER
Letter by Tyler Camargo MD at      Author: Tyler Camargo MD Service: -- Author Type: --    Filed:  Encounter Date: 12/27/2020 Status: (Other)         Amrita Esqueda  [address] [place] [address]             December 27, 2020         Dear Mr. Esqueda,    Below are the results from your recent visit:    Resulted Orders   Lipid [place] FASTING   Result Value Ref Range    Cholesterol 219 (H) <=199 mg/dL    Triglycerides 230 (H) <=149 mg/dL    HDL Cholesterol 28 (L) >=40 mg/dL    LDL Calculated 145 (H) <=129 mg/dL    Patient Fasting > 8hrs? Yes    ALT (SGPT)   Result Value Ref Range    ALT 27 0 - 45 U/L   Glycosylated Hemoglobin A1c   Result Value Ref Range    Hemoglobin A1c 6.8 (H) <=5.6 %   Basic Metabolic Panel   Result Value Ref Range    Sodium 140 136 - 145 mmol/L    Potassium 4.4 3.5 - 5.0 mmol/L    Chloride 109 (H) 98 - 107 mmol/L    CO2 17 (L) 22 - 31 mmol/L    Anion Gap, Calculation 14 5 - 18 mmol/L    Glucose 154 (H) 70 - 125 mg/dL    Calcium 9.5 8.5 - 10.5 mg/dL    BUN 33 (H) 8 - 28 mg/dL    Creatinine 2.27 (H) 0.70 - 1.30 mg/dL    GFR MDRD Af Amer 34 (L) >60 mL/min/1.73m2    GFR MDRD Non Af Amer 28 (L) >60 mL/min/1.73m2    Narrative    Fasting Glucose reference range is 70-99 mg/dL per  American Diabetes Association (ADA) guidelines.   Microalbumin, Random Urine   Result Value Ref Range    Microalbumin, Random Urine 16.83 (H) 0.00 - 1.99 mg/dL    Creatinine, Urine 163.4 mg/dL    Microalbumin/Creatinine Ratio Random Urine 103.0 (H) <=19.9 mg/g    Narrative    Microalbumin, Random Urine  <2.0 mg/dL . . . . . . . . Normal  3.0-30.0 mg/dL . . . . . . Microalbuminuria  >30.0 mg/dL . . . . . .  . Clinical Proteinuria    Microalbumin/Creatinine Ratio, Random Urine  <20 mg/g . . . . .. . . . Normal   mg/g . . . . . . . Microalbuminuria  >300 mg/g . . . . . . . . Clinical Proteinuria            Pharmacy confirmed two medicines.   I think they should be:     1.amlodipine-benazeprill    and    2.  glimepiride    I  refilled these because your BP is good and your diabetes control is good   You should stay on these.    Please call with questions or contact us using [employer].    Sincerely,        Electronically signed by Tyler Camargo MD

## 2021-06-27 ENCOUNTER — HEALTH MAINTENANCE LETTER (OUTPATIENT)
Age: 81
End: 2021-06-27

## 2021-07-03 NOTE — ADDENDUM NOTE
Addendum Note by Ivelisse Levi MD at 6/7/2018  5:05 PM     Author: Ivelisse Levi MD Service: -- Author Type: Physician    Filed: 6/7/2018  5:05 PM Encounter Date: 6/7/2018 Status: Signed    : Ivelisse Levi MD (Physician)    Addended by: IVELISSE LEVI on: 6/7/2018 05:05 PM        Modules accepted: Orders

## 2021-07-03 NOTE — ADDENDUM NOTE
Addendum Note by Ivelisse Levi MD at 5/22/2017  4:05 PM     Author: Ivelisse Levi MD Service: -- Author Type: Physician    Filed: 5/22/2017  4:05 PM Encounter Date: 5/22/2017 Status: Signed    : Ivelisse Levi MD (Physician)    Addended by: IVELISSE LEVI on: 5/22/2017 04:05 PM        Modules accepted: Orders

## 2021-07-03 NOTE — ADDENDUM NOTE
Addendum Note by Ivelisse Levi MD at 10/2/2019 10:00 AM     Author: Ivelisse Levi MD Service: -- Author Type: Physician    Filed: 10/3/2019  8:03 AM Encounter Date: 10/2/2019 Status: Signed    : Ivelisse Levi MD (Physician)    Addended by: IVELISSE LEVI on: 10/3/2019 08:03 AM        Modules accepted: Orders

## 2021-07-03 NOTE — ADDENDUM NOTE
Addendum Note by Tyler Courtney MD at 12/24/2020  9:20 AM     Author: Tyler Courtney MD Service: -- Author Type: Physician    Filed: 12/27/2020  5:20 PM Encounter Date: 12/24/2020 Status: Signed    : Tyler Courtney MD (Physician)    Addended by: TYLER COURTNEY on: 12/27/2020 05:20 PM        Modules accepted: Orders

## 2021-09-23 ENCOUNTER — OFFICE VISIT (OUTPATIENT)
Dept: FAMILY MEDICINE | Facility: CLINIC | Age: 81
End: 2021-09-23
Payer: COMMERCIAL

## 2021-09-23 VITALS
SYSTOLIC BLOOD PRESSURE: 134 MMHG | RESPIRATION RATE: 16 BRPM | HEIGHT: 63 IN | TEMPERATURE: 97.9 F | WEIGHT: 130 LBS | BODY MASS INDEX: 23.04 KG/M2 | HEART RATE: 65 BPM | DIASTOLIC BLOOD PRESSURE: 74 MMHG

## 2021-09-23 DIAGNOSIS — Z60.3 LANGUAGE BARRIER: ICD-10-CM

## 2021-09-23 DIAGNOSIS — Z75.8 LANGUAGE BARRIER: ICD-10-CM

## 2021-09-23 DIAGNOSIS — N18.32 STAGE 3B CHRONIC KIDNEY DISEASE (H): ICD-10-CM

## 2021-09-23 DIAGNOSIS — Z23 NEED FOR TDAP VACCINATION: ICD-10-CM

## 2021-09-23 DIAGNOSIS — Z23 NEED FOR IMMUNIZATION AGAINST INFLUENZA: Primary | ICD-10-CM

## 2021-09-23 DIAGNOSIS — E11.21 TYPE 2 DIABETES MELLITUS WITH DIABETIC NEPHROPATHY, WITHOUT LONG-TERM CURRENT USE OF INSULIN (H): ICD-10-CM

## 2021-09-23 DIAGNOSIS — N18.4 CKD (CHRONIC KIDNEY DISEASE) STAGE 4, GFR 15-29 ML/MIN (H): ICD-10-CM

## 2021-09-23 DIAGNOSIS — I10 ESSENTIAL HYPERTENSION: ICD-10-CM

## 2021-09-23 LAB
ALT SERPL W P-5'-P-CCNC: 25 U/L (ref 0–45)
ANION GAP SERPL CALCULATED.3IONS-SCNC: 12 MMOL/L (ref 5–18)
BUN SERPL-MCNC: 44 MG/DL (ref 8–28)
CALCIUM SERPL-MCNC: 9.7 MG/DL (ref 8.5–10.5)
CHLORIDE BLD-SCNC: 112 MMOL/L (ref 98–107)
CHOLEST SERPL-MCNC: 205 MG/DL
CO2 SERPL-SCNC: 17 MMOL/L (ref 22–31)
CREAT SERPL-MCNC: 2.27 MG/DL (ref 0.7–1.3)
FASTING STATUS PATIENT QL REPORTED: ABNORMAL
GFR SERPL CREATININE-BSD FRML MDRD: 26 ML/MIN/1.73M2
GLUCOSE BLD-MCNC: 162 MG/DL (ref 70–125)
HBA1C MFR BLD: 7.2 % (ref 0–5.6)
HDLC SERPL-MCNC: 27 MG/DL
LDLC SERPL CALC-MCNC: 131 MG/DL
POTASSIUM BLD-SCNC: 4.3 MMOL/L (ref 3.5–5)
SODIUM SERPL-SCNC: 141 MMOL/L (ref 136–145)
TRIGL SERPL-MCNC: 234 MG/DL

## 2021-09-23 PROCEDURE — 80061 LIPID PANEL: CPT | Performed by: FAMILY MEDICINE

## 2021-09-23 PROCEDURE — G0008 ADMIN INFLUENZA VIRUS VAC: HCPCS | Mod: 59 | Performed by: FAMILY MEDICINE

## 2021-09-23 PROCEDURE — 90715 TDAP VACCINE 7 YRS/> IM: CPT | Performed by: FAMILY MEDICINE

## 2021-09-23 PROCEDURE — 90662 IIV NO PRSV INCREASED AG IM: CPT | Performed by: FAMILY MEDICINE

## 2021-09-23 PROCEDURE — 84460 ALANINE AMINO (ALT) (SGPT): CPT | Performed by: FAMILY MEDICINE

## 2021-09-23 PROCEDURE — 36415 COLL VENOUS BLD VENIPUNCTURE: CPT | Performed by: FAMILY MEDICINE

## 2021-09-23 PROCEDURE — 90472 IMMUNIZATION ADMIN EACH ADD: CPT | Performed by: FAMILY MEDICINE

## 2021-09-23 PROCEDURE — 83036 HEMOGLOBIN GLYCOSYLATED A1C: CPT | Performed by: FAMILY MEDICINE

## 2021-09-23 PROCEDURE — 99214 OFFICE O/P EST MOD 30 MIN: CPT | Mod: 25 | Performed by: FAMILY MEDICINE

## 2021-09-23 PROCEDURE — 80048 BASIC METABOLIC PNL TOTAL CA: CPT | Performed by: FAMILY MEDICINE

## 2021-09-23 RX ORDER — GLIMEPIRIDE 4 MG/1
4 TABLET ORAL
Qty: 90 TABLET | Refills: 3 | Status: SHIPPED | OUTPATIENT
Start: 2021-09-23 | End: 2022-04-14

## 2021-09-23 RX ORDER — LANCETS 33 GAUGE
1 EACH MISCELLANEOUS 2 TIMES DAILY
Qty: 200 EACH | Refills: 3 | Status: SHIPPED | OUTPATIENT
Start: 2021-09-23 | End: 2022-04-14

## 2021-09-23 RX ORDER — BLOOD PRESSURE TEST KIT
KIT MISCELLANEOUS
Qty: 120 EACH | Refills: 4 | Status: SHIPPED | OUTPATIENT
Start: 2021-09-23

## 2021-09-23 RX ORDER — AMLODIPINE AND BENAZEPRIL HYDROCHLORIDE 5; 10 MG/1; MG/1
1 CAPSULE ORAL DAILY
Qty: 90 CAPSULE | Refills: 3 | Status: SHIPPED | OUTPATIENT
Start: 2021-09-23 | End: 2022-04-14

## 2021-09-23 RX ORDER — BLOOD SUGAR DIAGNOSTIC
STRIP MISCELLANEOUS
Qty: 200 STRIP | Refills: 3 | Status: SHIPPED | OUTPATIENT
Start: 2021-09-23 | End: 2022-04-14

## 2021-09-23 SDOH — SOCIAL STABILITY - SOCIAL INSECURITY: ACCULTURATION DIFFICULTY: Z60.3

## 2021-09-23 ASSESSMENT — MIFFLIN-ST. JEOR: SCORE: 1178.42

## 2021-09-23 NOTE — PROGRESS NOTES
Subjective:  80 year old male with concerns of follow-up on diabetes, hypertension, and chronic kidney disease.    Patient states he has been doing very well.  He does not have any concerns.  Previously we had contacted his pharmacy to review medications.    His list is very much a mess right now because of electronic health record updates and mergers.    Blood pressure well controlled.    No leg swelling.    Denies excessive thirst or urination.  No hypoglycemia symptoms.    No myalgias.  He does not appear to be prescribed a statin.    Outpatient Medications Prior to Visit   Medication Sig Dispense Refill     amLODIPine (NORVASC) 5 MG tablet [AMLODIPINE (NORVASC) 5 MG TABLET] Take 1 tablet (5 mg total) by mouth daily. 30 tablet 11     aspirin 81 MG EC tablet [ASPIRIN 81 MG EC TABLET] TAKE 1 TABLET BY MOUTH DAILY FOR STROKE PREVENTION // IB HNUB NOJ 1 LUB PAB KOM NTSHAV KHIAV ZOO 90 tablet 1     blood glucose meter (GLUCOMETER) [BLOOD GLUCOSE METER (GLUCOMETER)] Use 1 each As Directed daily. Dispense glucometer brand per patient's insurance at pharmacy discretion. 1 each 0     blood glucose test (CONTOUR NEXT STRIPS) strips [BLOOD GLUCOSE TEST (CONTOUR NEXT STRIPS) STRIPS] Use 1 each As Directed as needed. Dispense brand per patient's insurance at pharmacy discretion. 100 each 11     blood glucose test (ONETOUCH VERIO TEST STRIPS) strips [BLOOD GLUCOSE TEST (ONETOUCH VERIO TEST STRIPS) STRIPS] USE AS DIRECTED ONCE DAILY // 1 HNUB SIV 1 ZAUG RAWERIC NEVAREZ QHIA 100 strip 2     blood pressure monitor Kit [BLOOD PRESSURE MONITOR KIT] Dispense one digital blood pressure meter for at home use. 1 each 0     generic lancets (MICROLET LANCET) [GENERIC LANCETS (MICROLET LANCET)] Dispense brand per patient's insurance at pharmacy discretion. 100 each 11     glimepiride (AMARYL) 4 MG tablet [GLIMEPIRIDE (AMARYL) 4 MG TABLET] Take 1 tablet (4 mg total) by mouth daily before breakfast. 90 tablet 1     ONETOUCH DELICA LANCETS 33 gauge  "Misc [ONETOUCH DELICA LANCETS 33 GAUGE MISC] USE AS DIRECTED ONCE DAILY // 1 HNUB SIV 1 ZAUG RAWS LI QHIA 100 each 2     No facility-administered medications prior to visit.      History   Smoking Status     Former Smoker     Packs/day: 0.00   Smokeless Tobacco     Never Used     Comment: QUIT 5-6 YEARS AGO      Objective:  /74   Pulse 65   Temp 97.9  F (36.6  C) (Temporal)   Resp 16   Ht 1.59 m (5' 2.6\")   Wt 59 kg (130 lb)   BMI 23.33 kg/m    GENERAL: alert, not distressed  CHEST: clear, no rales, rhonchi, or wheezes  CARDIAC: regular without murmur, gallop, or rub  ABDOMEN: soft, non tender, non distended, normal bowel sounds    FOOT EXAM:  DP and PT pulses are normal.  Monofilament testing normal  Nails normal.  Hair growth absent.  Skin without changes or breakdown.    Recent Results (from the past 240 hour(s))   Hemoglobin A1c    Collection Time: 09/23/21 10:13 AM   Result Value Ref Range    Hemoglobin A1C 7.2 (H) 0.0 - 5.6 %   Basic metabolic panel  (Ca, Cl, CO2, Creat, Gluc, K, Na, BUN)    Collection Time: 09/23/21 10:13 AM   Result Value Ref Range    Sodium 141 136 - 145 mmol/L    Potassium 4.3 3.5 - 5.0 mmol/L    Chloride 112 (H) 98 - 107 mmol/L    Carbon Dioxide (CO2) 17 (L) 22 - 31 mmol/L    Anion Gap 12 5 - 18 mmol/L    Urea Nitrogen 44 (H) 8 - 28 mg/dL    Creatinine 2.27 (H) 0.70 - 1.30 mg/dL    Calcium 9.7 8.5 - 10.5 mg/dL    Glucose 162 (H) 70 - 125 mg/dL    GFR Estimate 26 (L) >60 mL/min/1.73m2   Lipid panel reflex to direct LDL Fasting    Collection Time: 09/23/21 10:13 AM   Result Value Ref Range    Cholesterol 205 (H) <=199 mg/dL    Triglycerides 234 (H) <=149 mg/dL    Direct Measure HDL 27 (L) >=40 mg/dL    LDL Cholesterol Calculated 131 (H) <=129 mg/dL    Patient Fasting > 8hrs? Unknown    ALT    Collection Time: 09/23/21 10:13 AM   Result Value Ref Range    ALT 25 0 - 45 U/L     Assessment and Plan:   1. Language barrier  This visit was conducted with the aid of a professional " .     2. Type 2 diabetes mellitus with diabetic nephropathy, without long-term current use of insulin (H)  Adequate control  - ONETOUCH VERIO IQ test strip; Use to test blood sugar 2 times daily or as directed.  Dispense: 200 strip; Refill: 3  - OneTouch Delica Lancets 33G MISC; 1 each 2 times daily  Dispense: 200 each; Refill: 3  - Alcohol Swabs PADS; Use as needed to check blood sugar  Dispense: 120 each; Refill: 4  - aspirin (ASA) 81 MG EC tablet; Take 1 tablet (81 mg) by mouth once for 1 dose  Dispense: 90 tablet; Refill: 3  - glimepiride (AMARYL) 4 MG tablet; Take 1 tablet (4 mg) by mouth daily before breakfast  Dispense: 90 tablet; Refill: 3    3. Stage 4 chronic kidney disease  A little worse today  Diabetes control and HTN control.  Will r refer to nephrology and hold off on prescribing statin.    4. Essential hypertension  Controlled  No med changes.  - Basic metabolic panel  (Ca, Cl, CO2, Creat, Gluc, K, Na, BUN); Future  - amLODIPine-benazepril (LOTREL) 5-10 MG capsule; Take 1 capsule by mouth daily  Dispense: 90 capsule; Refill: 3  - Basic metabolic panel  (Ca, Cl, CO2, Creat, Gluc, K, Na, BUN)    5. Need for immunization against influenza  - INFLUENZA, QUAD, HD, PF, 65+ (FLUZONE HD)    6. Need for Tdap vaccination  - TDAP VACCINE (Adacel, Boostrix)  [[id number]]

## 2021-09-24 ENCOUNTER — TELEPHONE (OUTPATIENT)
Dept: FAMILY MEDICINE | Facility: CLINIC | Age: 81
End: 2021-09-24

## 2021-09-24 NOTE — TELEPHONE ENCOUNTER
RN spoke to pt regarding Dr. Camargo message below. Pt verbalizes understanding and agree to see Kidney specialist. Pt has no further questions.     RIMA Oro, RN   [facility]    ----- Message from Tyler Camargo MD sent at 9/24/2021 12:06 PM CDT -----    Call:Kidney function has gotten just a little bit worse.  I think it would be good to see the kidney specialist at this time.  We will hold off on starting a cholesterol medicine until after you see them.

## 2021-11-03 ENCOUNTER — APPOINTMENT (OUTPATIENT)
Dept: INTERPRETER SERVICES | Facility: CLINIC | Age: 81
End: 2021-11-03
Payer: COMMERCIAL

## 2021-12-16 ENCOUNTER — IMMUNIZATION (OUTPATIENT)
Dept: NURSING | Facility: CLINIC | Age: 81
End: 2021-12-16
Payer: COMMERCIAL

## 2021-12-16 PROCEDURE — 91300 PR COVID VAC PFIZER DIL RECON 30 MCG/0.3 ML IM: CPT

## 2021-12-16 PROCEDURE — 0004A PR COVID VAC PFIZER DIL RECON 30 MCG/0.3 ML IM: CPT

## 2021-12-30 ENCOUNTER — PATIENT OUTREACH (OUTPATIENT)
Dept: GERIATRIC MEDICINE | Facility: CLINIC | Age: 81
End: 2021-12-30
Payer: COMMERCIAL

## 2021-12-30 NOTE — PROGRESS NOTES
[facility] Care Coordination Contact      [facility] Six-Month Telephone Assessment    6 month telephone assessment completed on 12/30/21.    ER visits: No  Hospitalizations: No  TCU stays: No  Significant health status changes: none  Falls/Injuries: No  ADL/IADL changes: No  Changes in services: No    Caregiver Assessment follow up:  No change    Goals: See POC in chart for goal progress documentation.      Will see member in 6 months for an annual health risk assessment.   Encouraged member to call CC with any questions or concerns in the meantime.  Giulia Owens RN PHN  [facility]  [phone]

## 2022-04-03 ENCOUNTER — HEALTH MAINTENANCE LETTER (OUTPATIENT)
Age: 82
End: 2022-04-03

## 2022-04-14 ENCOUNTER — OFFICE VISIT (OUTPATIENT)
Dept: FAMILY MEDICINE | Facility: CLINIC | Age: 82
End: 2022-04-14
Payer: COMMERCIAL

## 2022-04-14 VITALS
WEIGHT: 131 LBS | HEART RATE: 80 BPM | RESPIRATION RATE: 15 BRPM | SYSTOLIC BLOOD PRESSURE: 130 MMHG | HEIGHT: 62 IN | DIASTOLIC BLOOD PRESSURE: 72 MMHG | TEMPERATURE: 97 F | BODY MASS INDEX: 24.11 KG/M2

## 2022-04-14 DIAGNOSIS — N18.4 CKD (CHRONIC KIDNEY DISEASE) STAGE 4, GFR 15-29 ML/MIN (H): ICD-10-CM

## 2022-04-14 DIAGNOSIS — R31.29 MICROHEMATURIA: ICD-10-CM

## 2022-04-14 DIAGNOSIS — E11.3299 NON-PROLIFERATIVE DIABETIC RETINOPATHY (H): ICD-10-CM

## 2022-04-14 DIAGNOSIS — E11.21 TYPE 2 DIABETES MELLITUS WITH DIABETIC NEPHROPATHY, WITHOUT LONG-TERM CURRENT USE OF INSULIN (H): Primary | ICD-10-CM

## 2022-04-14 LAB
ALBUMIN UR-MCNC: 30 MG/DL
ANION GAP SERPL CALCULATED.3IONS-SCNC: 12 MMOL/L (ref 5–18)
APPEARANCE UR: CLEAR
BACTERIA #/AREA URNS HPF: ABNORMAL /HPF
BILIRUB UR QL STRIP: NEGATIVE
BUN SERPL-MCNC: 30 MG/DL (ref 8–28)
CALCIUM SERPL-MCNC: 9.5 MG/DL (ref 8.5–10.5)
CHLORIDE BLD-SCNC: 109 MMOL/L (ref 98–107)
CO2 SERPL-SCNC: 19 MMOL/L (ref 22–31)
COLOR UR AUTO: YELLOW
CREAT SERPL-MCNC: 2.47 MG/DL (ref 0.7–1.3)
GFR SERPL CREATININE-BSD FRML MDRD: 25 ML/MIN/1.73M2
GLUCOSE BLD-MCNC: 173 MG/DL (ref 70–125)
GLUCOSE UR STRIP-MCNC: NEGATIVE MG/DL
HBA1C MFR BLD: 7.2 % (ref 0–5.6)
HGB BLD-MCNC: 13.6 G/DL (ref 13.3–17.7)
HGB UR QL STRIP: ABNORMAL
KETONES UR STRIP-MCNC: NEGATIVE MG/DL
LEUKOCYTE ESTERASE UR QL STRIP: NEGATIVE
NITRATE UR QL: NEGATIVE
PH UR STRIP: 5 [PH] (ref 5–8)
PHOSPHATE SERPL-MCNC: 3.1 MG/DL (ref 2.5–4.5)
POTASSIUM BLD-SCNC: 4.1 MMOL/L (ref 3.5–5)
PTH-INTACT SERPL-MCNC: 138 PG/ML (ref 10–86)
RBC #/AREA URNS AUTO: ABNORMAL /HPF
SODIUM SERPL-SCNC: 140 MMOL/L (ref 136–145)
SP GR UR STRIP: 1.01 (ref 1–1.03)
UROBILINOGEN UR STRIP-ACNC: 0.2 E.U./DL
WBC #/AREA URNS AUTO: ABNORMAL /HPF

## 2022-04-14 PROCEDURE — 80048 BASIC METABOLIC PNL TOTAL CA: CPT | Performed by: FAMILY MEDICINE

## 2022-04-14 PROCEDURE — 85018 HEMOGLOBIN: CPT | Performed by: FAMILY MEDICINE

## 2022-04-14 PROCEDURE — 99214 OFFICE O/P EST MOD 30 MIN: CPT | Performed by: FAMILY MEDICINE

## 2022-04-14 PROCEDURE — 36415 COLL VENOUS BLD VENIPUNCTURE: CPT | Performed by: FAMILY MEDICINE

## 2022-04-14 PROCEDURE — 84100 ASSAY OF PHOSPHORUS: CPT | Performed by: FAMILY MEDICINE

## 2022-04-14 PROCEDURE — 83970 ASSAY OF PARATHORMONE: CPT | Performed by: FAMILY MEDICINE

## 2022-04-14 PROCEDURE — 83036 HEMOGLOBIN GLYCOSYLATED A1C: CPT | Performed by: FAMILY MEDICINE

## 2022-04-14 PROCEDURE — 81001 URINALYSIS AUTO W/SCOPE: CPT | Performed by: FAMILY MEDICINE

## 2022-04-14 PROCEDURE — 82043 UR ALBUMIN QUANTITATIVE: CPT | Performed by: FAMILY MEDICINE

## 2022-04-14 RX ORDER — LOSARTAN POTASSIUM 50 MG/1
50 TABLET ORAL DAILY
Qty: 90 TABLET | Refills: 0 | Status: SHIPPED | OUTPATIENT
Start: 2022-04-14 | End: 2022-10-03

## 2022-04-14 RX ORDER — GLIMEPIRIDE 4 MG/1
4 TABLET ORAL
Qty: 90 TABLET | Refills: 3 | Status: SHIPPED | OUTPATIENT
Start: 2022-04-14 | End: 2023-06-02

## 2022-04-14 RX ORDER — BLOOD SUGAR DIAGNOSTIC
STRIP MISCELLANEOUS
Qty: 200 STRIP | Refills: 3 | Status: SHIPPED | OUTPATIENT
Start: 2022-04-14

## 2022-04-14 RX ORDER — LANCETS 33 GAUGE
1 EACH MISCELLANEOUS 2 TIMES DAILY
Qty: 200 EACH | Refills: 3 | Status: SHIPPED | OUTPATIENT
Start: 2022-04-14

## 2022-04-14 NOTE — LETTER
April 15, 2022      Albert Jcarlos Esqueda  [address] [place] [address]        Dear ,    We are writing to inform you of your test results.    Diabetes control is reasonable.  No medicine changes for now.  We changed a blood pressure medicine--losartan is your new medicine.  Urine tests show some blood in the urine.  Blood tests also show kidney function is low--just a little worse than previous.  This should be evaluated.  I recommend we have you meet with two specialists a kidney specialist and a urology specialist.  You are also due for a visit to the eye doctor.    Resulted Orders   Hemoglobin   Result Value Ref Range    Hemoglobin 13.6 13.3 - 17.7 g/dL   HEMOGLOBIN A1C   Result Value Ref Range    Hemoglobin A1C 7.2 (H) 0.0 - 5.6 %      Comment:      Normal <5.7%   Prediabetes 5.7-6.4%    Diabetes 6.5% or higher     Note: Adopted from ADA consensus guidelines.   Basic metabolic panel  (Ca, Cl, CO2, Creat, Gluc, K, Na, BUN)   Result Value Ref Range    Sodium 140 136 - 145 mmol/L    Potassium 4.1 3.5 - 5.0 mmol/L    Chloride 109 (H) 98 - 107 mmol/L    Carbon Dioxide (CO2) 19 (L) 22 - 31 mmol/L    Anion Gap 12 5 - 18 mmol/L    Urea Nitrogen 30 (H) 8 - 28 mg/dL    Creatinine 2.47 (H) 0.70 - 1.30 mg/dL    Calcium 9.5 8.5 - 10.5 mg/dL    Glucose 173 (H) 70 - 125 mg/dL    GFR Estimate 25 (L) >60 mL/min/1.73m2      Comment:      Effective December 21, 2021 eGFRcr in adults is calculated using the 2021 CKD-EPI creatinine equation which includes age and gender (Cata et al., NEJM, DOI: 10.1056/JZZBqm3079535)   Parathyroid Hormone Intact   Result Value Ref Range    Parathyroid Hormone Intact 138 (H) 10 - 86 pg/mL   Phosphorus   Result Value Ref Range    Phosphorus 3.1 2.5 - 4.5 mg/dL   UA with Microscopic - lab collect   Result Value Ref Range    Color Urine Yellow Colorless, Straw, Light Yellow, Yellow    Appearance Urine Clear Clear    Glucose Urine Negative Negative mg/dL    Bilirubin Urine Negative Negative     Ketones Urine Negative Negative mg/dL    Specific Gravity Urine 1.015 1.005 - 1.030    Blood Urine Moderate (A) Negative    pH Urine 5.0 5.0 - 8.0    Protein Albumin Urine 30  (A) Negative mg/dL    Urobilinogen Urine 0.2 0.2, 1.0 E.U./dL    Nitrite Urine Negative Negative    Leukocyte Esterase Urine Negative Negative   Urine Microscopic Exam   Result Value Ref Range    Bacteria Urine Few (A) None Seen /HPF    RBC Urine 2-5 (A) 0-2 /HPF /HPF    WBC Urine None Seen 0-5 /HPF /HPF       If you have any questions or concerns, please call the clinic at the number listed above.       Sincerely,      Tyler Camargo MD

## 2022-04-14 NOTE — PROGRESS NOTES
"  Assessment & Plan     Type 2 diabetes mellitus with diabetic nephropathy, without long-term current use of insulin (H)  Reasonable control.  SGLT2 might be desirable, but would want nephrology input with GFR at 25    - OPTOMETRY REFERRAL  - Hemoglobin  - Albumin Random Urine Quantitative with Creat Ratio  - HEMOGLOBIN A1C  - glimepiride (AMARYL) 4 MG tablet  Dispense: 90 tablet; Refill: 3  - OneTouch Delica Lancets 33G MISC  Dispense: 200 each; Refill: 3  - ONETOUCH VERIO IQ test strip  Dispense: 200 strip; Refill: 3  - Hemoglobin  - HEMOGLOBIN A1C  - Albumin Random Urine Quantitative with Creat Ratio    Non-proliferative diabetic retinopathy (H)  Needs to see ophthalmology again.  Referral as above    CKD (chronic kidney disease) stage 4, GFR 15-29 ml/min (H)  BP good off med.  Cough with ACE  Discussed stopping amlo/katie combo and starting ARB.  Should see nephrology, though he is not convinced.  Will attempt  - losartan (COZAAR) 50 MG tablet  Dispense: 90 tablet; Refill: 0  - Basic metabolic panel  (Ca, Cl, CO2, Creat, Gluc, K, Na, BUN)  - Parathyroid Hormone Intact  - Phosphorus  - UA with Microscopic - lab collect  - Basic metabolic panel  (Ca, Cl, CO2, Creat, Gluc, K, Na, BUN)  - Parathyroid Hormone Intact  - Phosphorus  - UA with Microscopic - lab collect  - Urine Microscopic Exam    Mircorhematuria  Needs nephrology and urology consults.      No follow-ups on file.    Tyler Camargo MD  [facility] [place]   Albert Wa is a 81 year old who presents for the following health issues This visit was conducted with the aid of a professional .     HPI     Taking amlo/benzapril. Makes him cough.  Pills are large.  Has not taken them for 15 days, as he ran out.  woud like to change.    No glimepiride for 15 days either.  Denies excessive thirst or urination.    Poor kidney function.  Did not go to nephrologist.  No edema.  Feels his \"kindeys are strong,\" from a subjective " "standpoint.    Diabetes Follow-up    How often are you checking your blood sugar? Two times daily  What time of day are you checking your blood sugars (select all that apply)?  Before meals  Have you had any blood sugars above 200?  No  Have you had any blood sugars below 70?  No    What symptoms do you notice when your blood sugar is low?  None    What concerns do you have today about your diabetes?      Do you have any of these symptoms? (Select all that apply)  No numbness or tingling in feet.  No redness, sores or blisters on feet.  No complaints of excessive thirst.  No reports of blurry vision.  No significant changes to weight.    Have you had a diabetic eye exam in the last 12 months? No    BP Readings from Last 2 Encounters:   04/14/22 130/72   09/23/21 134/74     Hemoglobin A1C (%)   Date Value   04/14/2022 7.2 (H)   09/23/2021 7.2 (H)     LDL Cholesterol Calculated (mg/dL)   Date Value   09/23/2021 131 (H)   12/24/2020 145 (H)     LDL Cholesterol Direct (mg/dl)   Date Value   10/02/2019 115   06/07/2018 83     Hypertension Follow-up      Do you check your blood pressure regularly outside of the clinic? No     Are you following a low salt diet? Yes    Are your blood pressures ever more than 140 on the top number (systolic) OR more   than 90 on the bottom number (diastolic), for example 140/90? No      Objective    /72 (BP Location: Left arm, Patient Position: Sitting, Cuff Size: Adult Regular)   Pulse 80   Temp 97  F (36.1  C) (Temporal)   Resp 15   Ht 1.565 m (5' 1.61\")   Wt 59.4 kg (131 lb)   BMI 24.26 kg/m    Body mass index is 24.26 kg/m .  Physical Exam   GENERAL: alert, not distressed  CHEST: clear, no rales, rhonchi, or wheezes  CARDIAC: regular without murmur, gallop, or rub  LE: no edema.    Results for orders placed or performed in visit on 04/14/22   Hemoglobin     Status: Normal   Result Value Ref Range    Hemoglobin 13.6 13.3 - 17.7 g/dL   HEMOGLOBIN A1C     Status: Abnormal "   Result Value Ref Range    Hemoglobin A1C 7.2 (H) 0.0 - 5.6 %   Basic metabolic panel  (Ca, Cl, CO2, Creat, Gluc, K, Na, BUN)     Status: Abnormal   Result Value Ref Range    Sodium 140 136 - 145 mmol/L    Potassium 4.1 3.5 - 5.0 mmol/L    Chloride 109 (H) 98 - 107 mmol/L    Carbon Dioxide (CO2) 19 (L) 22 - 31 mmol/L    Anion Gap 12 5 - 18 mmol/L    Urea Nitrogen 30 (H) 8 - 28 mg/dL    Creatinine 2.47 (H) 0.70 - 1.30 mg/dL    Calcium 9.5 8.5 - 10.5 mg/dL    Glucose 173 (H) 70 - 125 mg/dL    GFR Estimate 25 (L) >60 mL/min/1.73m2   Parathyroid Hormone Intact     Status: Abnormal   Result Value Ref Range    Parathyroid Hormone Intact 138 (H) 10 - 86 pg/mL   Phosphorus     Status: Normal   Result Value Ref Range    Phosphorus 3.1 2.5 - 4.5 mg/dL   UA with Microscopic - lab collect     Status: Abnormal   Result Value Ref Range    Color Urine Yellow Colorless, Straw, Light Yellow, Yellow    Appearance Urine Clear Clear    Glucose Urine Negative Negative mg/dL    Bilirubin Urine Negative Negative    Ketones Urine Negative Negative mg/dL    Specific Gravity Urine 1.015 1.005 - 1.030    Blood Urine Moderate (A) Negative    pH Urine 5.0 5.0 - 8.0    Protein Albumin Urine 30  (A) Negative mg/dL    Urobilinogen Urine 0.2 0.2, 1.0 E.U./dL    Nitrite Urine Negative Negative    Leukocyte Esterase Urine Negative Negative   Urine Microscopic Exam     Status: Abnormal   Result Value Ref Range    Bacteria Urine Few (A) None Seen /HPF    RBC Urine 2-5 (A) 0-2 /HPF /HPF    WBC Urine None Seen 0-5 /HPF /HPF

## 2022-04-15 LAB
CREAT UR-MCNC: 114 MG/DL
MICROALBUMIN UR-MCNC: 17.52 MG/DL (ref 0–1.99)
MICROALBUMIN/CREAT UR: 153.7 MG/G CR

## 2022-05-18 ENCOUNTER — PATIENT OUTREACH (OUTPATIENT)
Dept: GERIATRIC MEDICINE | Facility: CLINIC | Age: 82
End: 2022-05-18
Payer: COMMERCIAL

## 2022-05-18 NOTE — PROGRESS NOTES
[facility] Care Coordination Contact    Called member to schedule annual HRA home visit. Left a message requesting a return call to schedule HRA.     Giulia Owens RN PHN  [facility]  [phone]

## 2022-05-19 ENCOUNTER — PATIENT OUTREACH (OUTPATIENT)
Dept: GERIATRIC MEDICINE | Facility: CLINIC | Age: 82
End: 2022-05-19
Payer: COMMERCIAL

## 2022-05-19 ASSESSMENT — ACTIVITIES OF DAILY LIVING (ADL): DEPENDENT_IADLS:: INDEPENDENT

## 2022-05-19 NOTE — PROGRESS NOTES
[facility] Care Coordination Contact    [facility] Home Visit Assessment     Home visit for Health Risk Assessment with Albert Esqueda completed on May 19, 2022 via phone d/t COVID    Type of residence:: Private home - stairs  Current living arrangement:: I live in a private home with family     Assessment completed with:: Patient    Current Care Plan  Member currently receiving the following home care services:     Member currently receiving the following community resources: None      Medication Review  Medication reconciliation completed in Epic: Yes  Medication set-up & administration: Independent-does not set up.  Self-administers medications.  Medication Risk Assessment Medication (1 or more, place referral to MTM): N/A: No risk factors identified  MTM Referral Placed: No: No risk factors idenified    Mental/Behavioral Health   Depression Screening:   PHQ-2 Total Score (Adult) - Positive if 3 or more points; Administer PHQ-9 if positive: 0       Mental health DX:: No        Falls Assessment:   Fallen 2 or more times in the past year?: No   Any fall with injury in the past year?: No    ADL/IADL Dependencies:   Dependent ADLs:: Independent  Dependent IADLs:: Independent    [facility] Plan sponsored benefits: Shared information re: Silver Sneakers/gym memberships, ASA, Calcium +D.    PCA Assessment completed at visit: No     Elderly Waiver Eligibility: No-does not meet criteria    Care Plan & Recommendations: Patient will continue to live independently or with minimal support in the community. No new services or DMEs at this time.    See [facility] for detailed assessment information.    Follow-Up Plan: Member informed of future contact, plan to f/u with member with a 6 month telephone assessment.  Contact information shared with member and family, encouraged member to call with any questions or concerns at any time.    [place] care continuum providers: Please see Snapshot and Care Management Flowsheets  for Specific details of care plan.    This CC note routed to PCP.    Giulia Owens RN N  [facility]  [phone]

## 2022-05-26 ENCOUNTER — PATIENT OUTREACH (OUTPATIENT)
Dept: GERIATRIC MEDICINE | Facility: CLINIC | Age: 82
End: 2022-05-26
Payer: COMMERCIAL

## 2022-05-26 NOTE — LETTER
May 26, 2022      KING INGRAM  [address] [place] [address]      Dear King Garber:    At [facility], we are dedicated to improving your health and well-being. Enclosed is the Comprehensive Care Plan that we developed with you on 5/19/22. Please review the Care Plan carefully.    As a reminder, some of the things we discussed at your visit include:    Your physical and mental health    Ways to reduce falls    Health care needs you may have    Don t forget to contact your care coordinator if you:    Have been hospitalized or plan to be hospitalized     Have had a fall     Have experienced a change in physical health    Are experiencing emotional problems     If you do not agree with your Care Plan, have questions about it, or have experienced a change in your needs, please call me at [phone]. If you are hearing impaired, please call the [place] Relay at 135 or [phone] (zagbaq-fi-mawbpe relay service).    Sincerely,    Giulia Owens RN, PHN  [phone]  [email or web address] ([facility]) is a health plan that contracts with both Medicare and the [place] Medical Assistance (Medicaid) program to provide benefits of both programs to enrollees. Enrollment in [facility] depends on contract renewal.    [id number])     [id number] (11/18)

## 2022-05-26 NOTE — PROGRESS NOTES
[facility] Care Coordination Contact    Received after visit chart from care coordinator.  Completed following tasks: Mailed copy of care plan to client, Mailed copy of POC signature sheet for member to sign and return in SASE  and Mailed are Safe Medication Disposal      Trent Cancino  [facility]  Case Management Specialist  [phone]

## 2022-07-14 ENCOUNTER — OFFICE VISIT (OUTPATIENT)
Dept: FAMILY MEDICINE | Facility: CLINIC | Age: 82
End: 2022-07-14
Payer: COMMERCIAL

## 2022-07-14 VITALS
HEIGHT: 62 IN | SYSTOLIC BLOOD PRESSURE: 134 MMHG | DIASTOLIC BLOOD PRESSURE: 70 MMHG | HEART RATE: 60 BPM | WEIGHT: 130 LBS | RESPIRATION RATE: 16 BRPM | TEMPERATURE: 97.8 F | BODY MASS INDEX: 23.92 KG/M2

## 2022-07-14 DIAGNOSIS — N18.4 CKD (CHRONIC KIDNEY DISEASE) STAGE 4, GFR 15-29 ML/MIN (H): ICD-10-CM

## 2022-07-14 DIAGNOSIS — Z00.00 ENCOUNTER FOR MEDICARE ANNUAL WELLNESS EXAM: Primary | ICD-10-CM

## 2022-07-14 DIAGNOSIS — R31.29 MICROHEMATURIA: ICD-10-CM

## 2022-07-14 DIAGNOSIS — Z23 NEED FOR COVID-19 VACCINE: ICD-10-CM

## 2022-07-14 DIAGNOSIS — E11.21 TYPE 2 DIABETES MELLITUS WITH DIABETIC NEPHROPATHY, WITHOUT LONG-TERM CURRENT USE OF INSULIN (H): ICD-10-CM

## 2022-07-14 DIAGNOSIS — E11.3299 NON-PROLIFERATIVE DIABETIC RETINOPATHY (H): ICD-10-CM

## 2022-07-14 LAB
ALBUMIN UR-MCNC: 30 MG/DL
ANION GAP SERPL CALCULATED.3IONS-SCNC: 12 MMOL/L (ref 7–15)
APPEARANCE UR: CLEAR
BACTERIA #/AREA URNS HPF: ABNORMAL /HPF
BILIRUB UR QL STRIP: NEGATIVE
BUN SERPL-MCNC: 43.7 MG/DL (ref 8–23)
CALCIUM SERPL-MCNC: 9.1 MG/DL (ref 8.8–10.2)
CHLORIDE SERPL-SCNC: 109 MMOL/L (ref 98–107)
COLOR UR AUTO: YELLOW
CREAT SERPL-MCNC: 2.35 MG/DL (ref 0.67–1.17)
CREAT UR-MCNC: 145 MG/DL
DEPRECATED HCO3 PLAS-SCNC: 17 MMOL/L (ref 22–29)
ERYTHROCYTE [DISTWIDTH] IN BLOOD BY AUTOMATED COUNT: 12.6 % (ref 10–15)
GFR SERPL CREATININE-BSD FRML MDRD: 27 ML/MIN/1.73M2
GLUCOSE SERPL-MCNC: 135 MG/DL (ref 70–99)
GLUCOSE UR STRIP-MCNC: NEGATIVE MG/DL
HBA1C MFR BLD: 6.6 % (ref 0–5.6)
HCT VFR BLD AUTO: 40 % (ref 40–53)
HGB BLD-MCNC: 13.6 G/DL (ref 13.3–17.7)
HGB UR QL STRIP: ABNORMAL
KETONES UR STRIP-MCNC: NEGATIVE MG/DL
LEUKOCYTE ESTERASE UR QL STRIP: NEGATIVE
MCH RBC QN AUTO: 30.5 PG (ref 26.5–33)
MCHC RBC AUTO-ENTMCNC: 34 G/DL (ref 31.5–36.5)
MCV RBC AUTO: 90 FL (ref 78–100)
MICROALBUMIN UR-MCNC: 61.1 MG/L
MICROALBUMIN/CREAT UR: 42.14 MG/G CR (ref 0–17)
NITRATE UR QL: NEGATIVE
PH UR STRIP: 5.5 [PH] (ref 5–8)
PLATELET # BLD AUTO: 160 10E3/UL (ref 150–450)
POTASSIUM SERPL-SCNC: 4.7 MMOL/L (ref 3.4–5.3)
RBC # BLD AUTO: 4.46 10E6/UL (ref 4.4–5.9)
RBC #/AREA URNS AUTO: ABNORMAL /HPF
SODIUM SERPL-SCNC: 138 MMOL/L (ref 136–145)
SP GR UR STRIP: 1.02 (ref 1–1.03)
SQUAMOUS #/AREA URNS AUTO: ABNORMAL /LPF
UROBILINOGEN UR STRIP-ACNC: 0.2 E.U./DL
WBC # BLD AUTO: 9.6 10E3/UL (ref 4–11)
WBC #/AREA URNS AUTO: ABNORMAL /HPF

## 2022-07-14 PROCEDURE — 80048 BASIC METABOLIC PNL TOTAL CA: CPT | Performed by: FAMILY MEDICINE

## 2022-07-14 PROCEDURE — G0438 PPPS, INITIAL VISIT: HCPCS | Performed by: FAMILY MEDICINE

## 2022-07-14 PROCEDURE — 0054A COVID-19,PF,PFIZER (12+ YRS): CPT | Performed by: FAMILY MEDICINE

## 2022-07-14 PROCEDURE — 83036 HEMOGLOBIN GLYCOSYLATED A1C: CPT | Performed by: FAMILY MEDICINE

## 2022-07-14 PROCEDURE — 99214 OFFICE O/P EST MOD 30 MIN: CPT | Mod: 25 | Performed by: FAMILY MEDICINE

## 2022-07-14 PROCEDURE — 91305 COVID-19,PF,PFIZER (12+ YRS): CPT | Performed by: FAMILY MEDICINE

## 2022-07-14 PROCEDURE — 81001 URINALYSIS AUTO W/SCOPE: CPT | Performed by: FAMILY MEDICINE

## 2022-07-14 PROCEDURE — 82043 UR ALBUMIN QUANTITATIVE: CPT | Performed by: FAMILY MEDICINE

## 2022-07-14 PROCEDURE — 85027 COMPLETE CBC AUTOMATED: CPT | Performed by: FAMILY MEDICINE

## 2022-07-14 PROCEDURE — 36415 COLL VENOUS BLD VENIPUNCTURE: CPT | Performed by: FAMILY MEDICINE

## 2022-07-14 ASSESSMENT — ENCOUNTER SYMPTOMS
WEAKNESS: 0
ABDOMINAL PAIN: 0
NAUSEA: 0
MYALGIAS: 0
HEARTBURN: 0
DYSURIA: 0
FEVER: 0
CHILLS: 0
DIARRHEA: 0
CONSTIPATION: 0
SHORTNESS OF BREATH: 0
PARESTHESIAS: 0
NERVOUS/ANXIOUS: 0
SORE THROAT: 0
ARTHRALGIAS: 0
HEMATURIA: 0
JOINT SWELLING: 0
FREQUENCY: 0
HEMATOCHEZIA: 0
HEADACHES: 0
EYE PAIN: 0
DIZZINESS: 0
COUGH: 0
PALPITATIONS: 0

## 2022-07-14 ASSESSMENT — ACTIVITIES OF DAILY LIVING (ADL): CURRENT_FUNCTION: NO ASSISTANCE NEEDED

## 2022-07-14 NOTE — PATIENT INSTRUCTIONS
Patient Education   Personalized Prevention Plan  You are due for the preventive services outlined below.  Your care team is available to assist you in scheduling these services.  If you have already completed any of these items, please share that information with your care team to update in your medical record.  Health Maintenance Due   Topic Date Due     Zoster (Shingles) Vaccine (1 of 2) Never done     Eye Exam  06/08/2019     COVID-19 Vaccine (4 - Booster for Pfizer series) 04/16/2022     Basic Metabolic Panel  07/14/2022     Kidney Microalbumin Urine Test  07/14/2022     Hemoglobin  10/14/2022     Your Health Risk Assessment indicates you feel you are not in good health    A healthy lifestyle helps keep the body fit and the mind alert. It helps protect you from disease, helps you fight disease, and helps prevent chronic disease (disease that doesn't go away) from getting worse. This is important as you get older and begin to notice twinges in muscles and joints and a decline in the strength and stamina you once took for granted. A healthy lifestyle includes good healthcare, good nutrition, weight control, recreation, and regular exercise. Avoid harmful substances and do what you can to keep safe. Another part of a healthy lifestyle is stay mentally active and socially involved.    Good healthcare     Have a wellness visit every year.     If you have new symptoms, let us know right away. Don't wait until the next checkup.     Take medicines exactly as prescribed and keep your medicines in a safe place. Tell us if your medicine causes problems.   Healthy diet and weight control     Eat 3 or 4 small, nutritious, low-fat, high-fiber meals a day. Include a variety of fruits, vegetables, and whole-grain foods.     Make sure you get enough calcium in your diet. Calcium, vitamin D, and exercise help prevent osteoporosis (bone thinning).     If you live alone, try eating with others when you can. That way you get a  good meal and have company while you eat it.     Try to keep a healthy weight. If you eat more calories than your body uses for energy, it will be stored as fat and you will gain weight.     Recreation   Recreation is not limited to sports and team events. It includes any activity that provides relaxation, interest, enjoyment, and exercise. Recreation provides an outlet for physical, mental, and social energy. It can give a sense of worth and achievement. It can help you stay healthy.    Mental Exercise and Social Involvement  Mental and emotional health is as important as physical health. Keep in touch with friends and family. Stay as active as possible. Continue to learn and challenge yourself.   Things you can do to stay mentally active are:    Learn something new, like a foreign language or musical instrument.     Play SCRABBLE or do crossword puzzles. If you cannot find people to play these games with you at home, you can play them with others on your computer through the Internet.     Join a games club--anything from card games to chess or checkers or lawn bowling.     Start a new hobby.     Go back to school.     Volunteer.     Read.   Keep up with world events.

## 2022-07-14 NOTE — PROGRESS NOTES
"SUBJECTIVE:   Albert Esqueda is a 81 year old male who presents for Preventive Visit.    Patient has been advised of split billing requirements and indicates understanding: Yes  Are you in the first 12 months of your Medicare coverage?  Yes,  See eye Doctor in the last year  Healthy Habits:     In general, how would you rate your overall health?  Fair    Frequency of exercise:  4-5 days/week    Duration of exercise:  30-45 minutes    Do you usually eat at least 4 servings of fruit and vegetables a day, include whole grains    & fiber and avoid regularly eating high fat or \"junk\" foods?  Yes    Taking medications regularly:  Yes    Medication side effects:  None    Ability to successfully perform activities of daily living:  No assistance needed    Home Safety:  No safety concerns identified    Hearing Impairment:  No hearing concerns    In the past 6 months, have you been bothered by leaking of urine?  No    In general, how would you rate your overall mental or emotional health?  Good      PHQ-2 Total Score: 0    Additional concerns today:  No    Do you feel safe in your environment? Yes    Have you ever done Advance Care Planning? (For example, a Health Directive, POLST, or a discussion with a medical provider or your loved ones about your wishes): Yes, patient states has an Advance Care Planning document and will bring a copy to the clinic.    He cites his daughter in law Pedro Ron as decision maker.  He indicates FULL CODE.    Fall risk  Fallen 2 or more times in the past year?: No  Any fall with injury in the past year?: No  click delete button to remove this line now  Cognitive Screening   1) Repeat 3 items (Leader, Season, Table)    2) Clock draw: ABNORMAL number placement wrong and hands not placed correct  3) 3 item recall: Recalls 2 objects   Results: ABNORMAL clock, 1-2 items recalled: PROBABLE COGNITIVE IMPAIRMENT, **INFORM PROVIDER**    Mini-CogTM Copyright ERIC Villasenor. Licensed by the author for use in " [facility]; reprinted with permission ([email or web address]). All rights reserved.      Do you have sleep apnea, excessive snoring or daytime drowsiness?: no    Reviewed and updated as needed this visit by clinical staff   Tobacco  Allergies  Meds  Problems  Med Hx  Surg Hx  Fam Hx          Reviewed and updated as needed this visit by Provider   Tobacco  Allergies  Meds  Problems  Med Hx  Surg Hx  Fam Hx           Social History     Tobacco Use     Smoking status: Former Smoker     Packs/day: 0.00     Smokeless tobacco: Never Used     Tobacco comment: QUIT 5-6 YEARS AGO   Substance Use Topics     Alcohol use: Not on file     If you drink alcohol do you typically have >3 drinks per day or >7 drinks per week? No    Alcohol Use 7/14/2022   Prescreen: >3 drinks/day or >7 drinks/week? -   Prescreen: >3 drinks/day or >7 drinks/week? No       Diabetes Follow-up    How often are you checking your blood sugar? One time daily  What time of day are you checking your blood sugars (select all that apply)?  Before meals  Have you had any blood sugars above 200?  No  Have you had any blood sugars below 70?  No    What symptoms do you notice when your blood sugar is low?  None and Not applicable    What concerns do you have today about your diabetes? None     Do you have any of these symptoms? (Select all that apply)  No numbness or tingling in feet.  No redness, sores or blisters on feet.  No complaints of excessive thirst.  No reports of blurry vision.  No significant changes to weight.    Have you had a diabetic eye exam in the last 12 months? Yes- Date of last eye exam: 2021,  Location: [facility] [place]    BP Readings from Last 2 Encounters:   07/14/22 134/70   04/14/22 130/72     Hemoglobin A1C (%)   Date Value   04/14/2022 7.2 (H)   09/23/2021 7.2 (H)     LDL Cholesterol Calculated (mg/dL)   Date Value   09/23/2021 131 (H)   12/24/2020 145 (H)     LDL Cholesterol Direct (mg/dl)   Date Value    10/02/2019 115   06/07/2018 83     Current providers sharing in care for this patient include:   Patient Care Team:  Tyler Camargo MD as PCP - General (Family Practice)  Giulia Owens, RN as Lead Care Coordinator (Primary Care - CC)  Tyler Camargo MD as Assigned PCP  Bharath Valdez MD as MD (Nephrology)    The following health maintenance items are reviewed in Epic and correct as of today:  Health Maintenance Due   Topic Date Due     ZOSTER IMMUNIZATION (1 of 2) Never done     EYE EXAM  06/08/2019     COVID-19 Vaccine (4 - Booster for Pfizer series) 04/16/2022     BMP  07/14/2022     MICROALBUMIN  07/14/2022     HEMOGLOBIN  10/14/2022     BP Readings from Last 3 Encounters:   07/14/22 134/70   04/14/22 130/72   09/23/21 134/74    Wt Readings from Last 3 Encounters:   07/14/22 59 kg (130 lb)   04/14/22 59.4 kg (131 lb)   09/23/21 59 kg (130 lb)                  Patient Active Problem List   Diagnosis     Type 2 diabetes mellitus with kidney complication, without long-term current use of insulin (H)     Hypercholesterolemia     Hypertension     Hemoccult Positive Stool     CKD (chronic kidney disease) stage 3, GFR 30-59 ml/min (H)     Non-proliferative diabetic retinopathy (H)     Proteinuria     Language barrier     CKD (chronic kidney disease) stage 4, GFR 15-29 ml/min (H)     History reviewed. No pertinent surgical history.    Social History     Tobacco Use     Smoking status: Former Smoker     Packs/day: 0.00     Smokeless tobacco: Never Used     Tobacco comment: QUIT 5-6 YEARS AGO   Substance Use Topics     Alcohol use: Not on file     History reviewed. No pertinent family history.      Current Outpatient Medications   Medication Sig Dispense Refill     Alcohol Swabs PADS Use as needed to check blood sugar 120 each 4     glimepiride (AMARYL) 4 MG tablet Take 1 tablet (4 mg) by mouth daily before breakfast 90 tablet 3     losartan (COZAAR) 50 MG tablet Take 1 tablet (50 mg) by mouth daily 90 tablet 0      "OneTouch Delica Lancets 33G MISC 1 each 2 times daily 200 each 3     ONETOUCH VERIO IQ test strip Use to test blood sugar 2 times daily or as directed. 200 strip 3     No Known Allergies  Recent Labs   Lab Test 04/14/22  1005 09/23/21  1013 12/24/20  0954 05/08/20  1112 03/30/20  0758 11/08/19  1533 10/02/19  1023   A1C 7.2* 7.2* 6.8* 9.5* 9.4*  --  8.5*   LDL  --  131* 145*  --   --   --  115   HDL  --  27* 28*  --   --   --   --    TRIG  --  234* 230*  --   --   --   --    ALT  --  25 27  --  33  --  25   CR 2.47* 2.27* 2.27* 2.03* 2.14*   < > 1.97*   GFRESTIMATED 25* 26* 28* 32* 30*  --  33*   GFRESTBLACK  --   --  34* 38* 36*  --  40*   POTASSIUM 4.1 4.3 4.4 4.2 3.9   < > 4.0    < > = values in this interval not displayed.      Review of Systems  GEN: no fevers or chills   ENT: no sinus concerns, normal hearing and vision   RESP: no cough or wheezing   CV: no dyspnea, palpitations, edema or chest pain   GI: no nausea, vomiting, diarrhea, or constipation   : normal urination   ENDO: no hot or cold intolerance, no polydipsia or polyuria   MS: no myalgias or arthralgias   DERM: no rash or bruising   PSYCH: no depression concerns     OBJECTIVE:   /70 (BP Location: Left arm, Patient Position: Sitting, Cuff Size: Adult Regular)   Pulse 60   Temp 97.8  F (36.6  C) (Temporal)   Resp 16   Ht 1.57 m (5' 1.81\")   Wt 59 kg (130 lb)   BMI 23.92 kg/m   Estimated body mass index is 23.92 kg/m  as calculated from the following:    Height as of this encounter: 1.57 m (5' 1.81\").    Weight as of this encounter: 59 kg (130 lb).  Physical Exam  GENERAL: alert, not distressed  CHEST: clear, no rales, rhonchi, or wheezes  CARDIAC: regular without murmur, gallop, or rub  ABDOMEN: soft, non tender, non distended, normal bowel sounds  LE: no calf tenderness or edema      ASSESSMENT / PLAN:   Albert Garber was seen today for medicare visit.    Diagnoses and all orders for this visit:    Encounter for Medicare annual wellness " "exam    CKD (chronic kidney disease) stage 4, GFR 15-29 ml/min (H)  Microhematuria  Referred at last visit to Urology and Nephrology.  Didn't want to go, so didn't.  Not seeing blood in urine.  BP controlled.  Should continue ARB.  If blood still present, get abd/pelvis CT  Reinforce seeing urology and nephrology (which he should do anyway with CKD4)  -     UA with Microscopic - lab collect; Future  -     BASIC METABOLIC PANEL; Future  -     Albumin Random Urine Quantitative with Creat Ratio; Future  -     CBC with platelets; Future    Type 2 diabetes mellitus with diabetic nephropathy, without long-term current use of insulin (H)  Reasonable control with A1c <8.  Consider SLGT2 with nephrology.  -     Albumin Random Urine Quantitative with Creat Ratio; Future  -     Hemoglobin A1c; Future    Non-proliferative diabetic retinopathy (H)  SHAYE signed for notes about eye exam.    Need for COVID-19 vaccine  -     COVID-19,PF,PFIZER (12+ Yrs GRAY LABEL)    COUNSELING:  Reviewed preventive health counseling, as reflected in patient instructions       Regular exercise       Healthy diet/nutrition       Immunizations    Vaccinated for: COVID and Declined: Zoster      Estimated body mass index is 23.92 kg/m  as calculated from the following:    Height as of this encounter: 1.57 m (5' 1.81\").    Weight as of this encounter: 59 kg (130 lb).    He reports that he has quit smoking. He smoked 0.00 packs per day. He has never used smokeless tobacco.    Tyler Camargo MD  [facility]    Identified Health Risks:    The patient was provided with suggestions to help him develop a healthy physical lifestyle.  "

## 2022-08-19 ENCOUNTER — PATIENT OUTREACH (OUTPATIENT)
Dept: GERIATRIC MEDICINE | Facility: CLINIC | Age: 82
End: 2022-08-19

## 2022-08-19 NOTE — PROGRESS NOTES
CC updated program tasks and targets for [employer] gwendolyn launch    Giulia Owens RN N  [facility]  [phone]

## 2022-09-30 ENCOUNTER — TRANSFERRED RECORDS (OUTPATIENT)
Dept: HEALTH INFORMATION MANAGEMENT | Facility: CLINIC | Age: 82
End: 2022-09-30

## 2022-09-30 LAB — RETINOPATHY: POSITIVE

## 2022-10-02 ENCOUNTER — HEALTH MAINTENANCE LETTER (OUTPATIENT)
Age: 82
End: 2022-10-02

## 2022-10-03 ENCOUNTER — MYC REFILL (OUTPATIENT)
Dept: FAMILY MEDICINE | Facility: CLINIC | Age: 82
End: 2022-10-03

## 2022-10-03 DIAGNOSIS — N18.4 CKD (CHRONIC KIDNEY DISEASE) STAGE 4, GFR 15-29 ML/MIN (H): ICD-10-CM

## 2022-10-04 RX ORDER — LOSARTAN POTASSIUM 50 MG/1
50 TABLET ORAL DAILY
Qty: 90 TABLET | Refills: 0 | Status: SHIPPED | OUTPATIENT
Start: 2022-10-04 | End: 2023-06-29 | Stop reason: DRUGHIGH

## 2022-11-18 ENCOUNTER — PATIENT OUTREACH (OUTPATIENT)
Dept: GERIATRIC MEDICINE | Facility: CLINIC | Age: 82
End: 2022-11-18

## 2022-11-18 NOTE — PROGRESS NOTES
[facility] Care Coordination Contact      [facility] Six-Month Telephone Assessment    6 month telephone assessment completed on 11/18/22.    ER visits: No  Hospitalizations: No  TCU stays: No  Significant health status changes: none  Falls/Injuries: No  ADL/IADL changes: No  Changes in services: No    Caregiver Assessment follow up:  No change    Goals: See POC in chart for goal progress documentation.      Will see member in 6 months for an annual health risk assessment.   Encouraged member to call CC with any questions or concerns in the meantime.     Giulia Owens RN PHN  [facility]  [phone]

## 2022-11-23 ENCOUNTER — OFFICE VISIT (OUTPATIENT)
Dept: FAMILY MEDICINE | Facility: CLINIC | Age: 82
End: 2022-11-23
Payer: COMMERCIAL

## 2022-11-23 ENCOUNTER — HOSPITAL ENCOUNTER (EMERGENCY)
Facility: CLINIC | Age: 82
Discharge: HOME | End: 2022-11-23
Attending: STUDENT IN AN ORGANIZED HEALTH CARE EDUCATION/TRAINING PROGRAM | Admitting: STUDENT IN AN ORGANIZED HEALTH CARE EDUCATION/TRAINING PROGRAM
Payer: COMMERCIAL

## 2022-11-23 ENCOUNTER — APPOINTMENT (OUTPATIENT)
Dept: CT IMAGING | Facility: CLINIC | Age: 82
End: 2022-11-23
Attending: PHYSICIAN ASSISTANT
Payer: COMMERCIAL

## 2022-11-23 VITALS
SYSTOLIC BLOOD PRESSURE: 141 MMHG | DIASTOLIC BLOOD PRESSURE: 67 MMHG | TEMPERATURE: 97.8 F | OXYGEN SATURATION: 99 % | BODY MASS INDEX: 25.13 KG/M2 | HEART RATE: 70 BPM | WEIGHT: 128 LBS | RESPIRATION RATE: 16 BRPM | HEIGHT: 60 IN

## 2022-11-23 VITALS
BODY MASS INDEX: 24.17 KG/M2 | OXYGEN SATURATION: 97 % | HEART RATE: 68 BPM | HEIGHT: 61 IN | TEMPERATURE: 98.2 F | WEIGHT: 128 LBS | DIASTOLIC BLOOD PRESSURE: 72 MMHG | RESPIRATION RATE: 16 BRPM | SYSTOLIC BLOOD PRESSURE: 110 MMHG

## 2022-11-23 DIAGNOSIS — N20.0 KIDNEY STONE: ICD-10-CM

## 2022-11-23 DIAGNOSIS — R10.9 LEFT SIDED ABDOMINAL PAIN: Primary | ICD-10-CM

## 2022-11-23 LAB
ALBUMIN SERPL-MCNC: 3.7 G/DL (ref 3.5–5)
ALBUMIN UR-MCNC: 30 MG/DL
ALP SERPL-CCNC: 102 U/L (ref 45–120)
ALT SERPL W P-5'-P-CCNC: <9 U/L (ref 0–45)
ANION GAP SERPL CALCULATED.3IONS-SCNC: 10 MMOL/L (ref 5–18)
ANION GAP SERPL CALCULATED.3IONS-SCNC: 9 MMOL/L (ref 5–18)
ANION GAP SERPL CALCULATED.3IONS-SCNC: 9 MMOL/L (ref 5–18)
APPEARANCE UR: CLEAR
AST SERPL W P-5'-P-CCNC: 10 U/L (ref 0–40)
BACTERIA #/AREA URNS HPF: ABNORMAL /HPF
BASOPHILS # BLD AUTO: 0 10E3/UL (ref 0–0.2)
BASOPHILS NFR BLD AUTO: 0 %
BILIRUB DIRECT SERPL-MCNC: 0.2 MG/DL
BILIRUB SERPL-MCNC: 0.6 MG/DL (ref 0–1)
BILIRUB UR QL STRIP: NEGATIVE
BUN SERPL-MCNC: 37 MG/DL (ref 8–28)
BUN SERPL-MCNC: 40 MG/DL (ref 8–28)
BUN SERPL-MCNC: 42 MG/DL (ref 8–28)
C REACTIVE PROTEIN LHE: 2.8 MG/DL (ref 0–?)
CALCIUM SERPL-MCNC: 8.2 MG/DL (ref 8.5–10.5)
CALCIUM SERPL-MCNC: 8.7 MG/DL (ref 8.5–10.5)
CALCIUM SERPL-MCNC: 8.8 MG/DL (ref 8.5–10.5)
CHLORIDE BLD-SCNC: 112 MMOL/L (ref 98–107)
CHLORIDE BLD-SCNC: 112 MMOL/L (ref 98–107)
CHLORIDE BLD-SCNC: 114 MMOL/L (ref 98–107)
CO2 SERPL-SCNC: 16 MMOL/L (ref 22–31)
CO2 SERPL-SCNC: 17 MMOL/L (ref 22–31)
CO2 SERPL-SCNC: 17 MMOL/L (ref 22–31)
COLOR UR AUTO: ABNORMAL
CREAT SERPL-MCNC: 2.81 MG/DL (ref 0.7–1.3)
CREAT SERPL-MCNC: 3.33 MG/DL (ref 0.7–1.3)
CREAT SERPL-MCNC: 3.38 MG/DL (ref 0.7–1.3)
EOSINOPHIL # BLD AUTO: 0.4 10E3/UL (ref 0–0.7)
EOSINOPHIL NFR BLD AUTO: 4 %
ERYTHROCYTE [DISTWIDTH] IN BLOOD BY AUTOMATED COUNT: 13 % (ref 10–15)
GFR SERPL CREATININE-BSD FRML MDRD: 17 ML/MIN/1.73M2
GFR SERPL CREATININE-BSD FRML MDRD: 18 ML/MIN/1.73M2
GFR SERPL CREATININE-BSD FRML MDRD: 21 ML/MIN/1.73M2
GLUCOSE BLD-MCNC: 111 MG/DL (ref 70–125)
GLUCOSE BLD-MCNC: 132 MG/DL (ref 70–125)
GLUCOSE BLD-MCNC: 71 MG/DL (ref 70–125)
GLUCOSE UR STRIP-MCNC: NEGATIVE MG/DL
HCT VFR BLD AUTO: 36.3 % (ref 40–53)
HGB BLD-MCNC: 12.2 G/DL (ref 13.3–17.7)
HGB UR QL STRIP: ABNORMAL
IMM GRANULOCYTES # BLD: 0 10E3/UL
IMM GRANULOCYTES NFR BLD: 0 %
KETONES UR STRIP-MCNC: NEGATIVE MG/DL
LEUKOCYTE ESTERASE UR QL STRIP: NEGATIVE
LIPASE SERPL-CCNC: 54 U/L (ref 0–52)
LYMPHOCYTES # BLD AUTO: 2.8 10E3/UL (ref 0.8–5.3)
LYMPHOCYTES NFR BLD AUTO: 29 %
MCH RBC QN AUTO: 31 PG (ref 26.5–33)
MCHC RBC AUTO-ENTMCNC: 33.6 G/DL (ref 31.5–36.5)
MCV RBC AUTO: 92 FL (ref 78–100)
MONOCYTES # BLD AUTO: 0.9 10E3/UL (ref 0–1.3)
MONOCYTES NFR BLD AUTO: 9 %
MUCOUS THREADS #/AREA URNS LPF: PRESENT /LPF
NEUTROPHILS # BLD AUTO: 5.4 10E3/UL (ref 1.6–8.3)
NEUTROPHILS NFR BLD AUTO: 58 %
NITRATE UR QL: NEGATIVE
NRBC # BLD AUTO: 0 10E3/UL
NRBC BLD AUTO-RTO: 0 /100
PH UR STRIP: 5.5 [PH] (ref 5–7)
PLATELET # BLD AUTO: 161 10E3/UL (ref 150–450)
POTASSIUM BLD-SCNC: 3.8 MMOL/L (ref 3.5–5)
POTASSIUM BLD-SCNC: 3.9 MMOL/L (ref 3.5–5)
POTASSIUM BLD-SCNC: 4 MMOL/L (ref 3.5–5)
PROT SERPL-MCNC: 7.2 G/DL (ref 6–8)
RBC # BLD AUTO: 3.94 10E6/UL (ref 4.4–5.9)
RBC URINE: 5 /HPF
SODIUM SERPL-SCNC: 138 MMOL/L (ref 136–145)
SODIUM SERPL-SCNC: 138 MMOL/L (ref 136–145)
SODIUM SERPL-SCNC: 140 MMOL/L (ref 136–145)
SP GR UR STRIP: 1.02 (ref 1–1.03)
UROBILINOGEN UR STRIP-MCNC: <2 MG/DL
WBC # BLD AUTO: 9.6 10E3/UL (ref 4–11)
WBC URINE: 4 /HPF

## 2022-11-23 PROCEDURE — 85025 COMPLETE CBC W/AUTO DIFF WBC: CPT | Performed by: STUDENT IN AN ORGANIZED HEALTH CARE EDUCATION/TRAINING PROGRAM

## 2022-11-23 PROCEDURE — 82248 BILIRUBIN DIRECT: CPT | Performed by: EMERGENCY MEDICINE

## 2022-11-23 PROCEDURE — 80053 COMPREHEN METABOLIC PANEL: CPT | Performed by: EMERGENCY MEDICINE

## 2022-11-23 PROCEDURE — 86140 C-REACTIVE PROTEIN: CPT | Performed by: STUDENT IN AN ORGANIZED HEALTH CARE EDUCATION/TRAINING PROGRAM

## 2022-11-23 PROCEDURE — 36415 COLL VENOUS BLD VENIPUNCTURE: CPT | Performed by: STUDENT IN AN ORGANIZED HEALTH CARE EDUCATION/TRAINING PROGRAM

## 2022-11-23 PROCEDURE — 83690 ASSAY OF LIPASE: CPT | Performed by: STUDENT IN AN ORGANIZED HEALTH CARE EDUCATION/TRAINING PROGRAM

## 2022-11-23 PROCEDURE — 85025 COMPLETE CBC W/AUTO DIFF WBC: CPT | Performed by: EMERGENCY MEDICINE

## 2022-11-23 PROCEDURE — 81001 URINALYSIS AUTO W/SCOPE: CPT | Performed by: EMERGENCY MEDICINE

## 2022-11-23 PROCEDURE — 83690 ASSAY OF LIPASE: CPT | Performed by: EMERGENCY MEDICINE

## 2022-11-23 PROCEDURE — 99284 EMERGENCY DEPT VISIT MOD MDM: CPT | Mod: 25

## 2022-11-23 PROCEDURE — 36415 COLL VENOUS BLD VENIPUNCTURE: CPT | Performed by: EMERGENCY MEDICINE

## 2022-11-23 PROCEDURE — 99215 OFFICE O/P EST HI 40 MIN: CPT | Performed by: PHYSICIAN ASSISTANT

## 2022-11-23 PROCEDURE — 258N000003 HC RX IP 258 OP 636: Performed by: STUDENT IN AN ORGANIZED HEALTH CARE EDUCATION/TRAINING PROGRAM

## 2022-11-23 PROCEDURE — 96360 HYDRATION IV INFUSION INIT: CPT

## 2022-11-23 PROCEDURE — 81001 URINALYSIS AUTO W/SCOPE: CPT | Performed by: STUDENT IN AN ORGANIZED HEALTH CARE EDUCATION/TRAINING PROGRAM

## 2022-11-23 PROCEDURE — 74176 CT ABD & PELVIS W/O CONTRAST: CPT

## 2022-11-23 PROCEDURE — 82248 BILIRUBIN DIRECT: CPT | Performed by: STUDENT IN AN ORGANIZED HEALTH CARE EDUCATION/TRAINING PROGRAM

## 2022-11-23 PROCEDURE — 80053 COMPREHEN METABOLIC PANEL: CPT | Performed by: STUDENT IN AN ORGANIZED HEALTH CARE EDUCATION/TRAINING PROGRAM

## 2022-11-23 RX ORDER — DIMENHYDRINATE 50 MG
50 TABLET ORAL AT BEDTIME
Qty: 7 TABLET | Refills: 0 | Status: SHIPPED | OUTPATIENT
Start: 2022-11-23 | End: 2022-11-30

## 2022-11-23 RX ORDER — DIMENHYDRINATE 50 MG
50 TABLET ORAL EVERY 6 HOURS PRN
Qty: 28 TABLET | Refills: 0 | Status: SHIPPED | OUTPATIENT
Start: 2022-11-23 | End: 2022-11-30

## 2022-11-23 RX ORDER — IBUPROFEN 200 MG
400 TABLET ORAL EVERY 6 HOURS
Qty: 56 TABLET | Refills: 0 | Status: SHIPPED | OUTPATIENT
Start: 2022-11-23 | End: 2022-11-30

## 2022-11-23 RX ORDER — ACETAMINOPHEN 500 MG
1000 TABLET ORAL EVERY 6 HOURS
Qty: 56 TABLET | Refills: 0 | Status: SHIPPED | OUTPATIENT
Start: 2022-11-23 | End: 2022-11-30

## 2022-11-23 RX ORDER — OXYCODONE HYDROCHLORIDE 5 MG/1
5 TABLET ORAL EVERY 4 HOURS PRN
Qty: 6 TABLET | Refills: 0 | Status: SHIPPED | OUTPATIENT
Start: 2022-11-23 | End: 2022-11-27

## 2022-11-23 RX ORDER — CEPHALEXIN 500 MG/1
500 CAPSULE ORAL 2 TIMES DAILY
Qty: 14 CAPSULE | Refills: 0 | Status: SHIPPED | OUTPATIENT
Start: 2022-11-23 | End: 2022-11-30

## 2022-11-23 RX ADMIN — SODIUM CHLORIDE, POTASSIUM CHLORIDE, SODIUM LACTATE AND CALCIUM CHLORIDE 500 ML: 600; 310; 30; 20 INJECTION, SOLUTION INTRAVENOUS at 17:08

## 2022-11-23 RX ADMIN — SODIUM CHLORIDE, POTASSIUM CHLORIDE, SODIUM LACTATE AND CALCIUM CHLORIDE 1000 ML: 600; 310; 30; 20 INJECTION, SOLUTION INTRAVENOUS at 16:20

## 2022-11-23 ASSESSMENT — ENCOUNTER SYMPTOMS
NAUSEA: 0
FEVER: 0
WHEEZING: 0
NEUROLOGICAL NEGATIVE: 1
ABDOMINAL PAIN: 1
EYES NEGATIVE: 1
VOMITING: 0
CHILLS: 0
HEARTBURN: 0
DIARRHEA: 0
ABDOMINAL DISTENTION: 0
SHORTNESS OF BREATH: 0
COUGH: 0
FATIGUE: 0

## 2022-11-23 ASSESSMENT — ACTIVITIES OF DAILY LIVING (ADL)
ADLS_ACUITY_SCORE: 35
ADLS_ACUITY_SCORE: 35

## 2022-11-23 NOTE — ED PROVIDER NOTES
Emergency Department Encounter         FINAL IMPRESSION:  Ureterolithiasis        ED COURSE AND MEDICAL DECISION MAKING       ED Course as of 11/23/22 2025 Wed Nov 23, 2022   1604 82-year-old history of remote kidney stone, here from home with 2 days of worsening left flank pain and abdominal pain.  No fevers chills nausea vomiting chest pain or trouble breathing.  No dysuria. He looks well on arrival.  Vitals are stable.  Afebrile.  \CT of triage showing a stone.   Patient with significant NAINA, given fluids with repeat BMP showing improvement. Pt reports that he thought eating or drinking would have made his pain worse so I suspect decrease PO causing NAINA              3:59 PM I met with the patient to obtain patient history and performed a physical exam. Discussed plan for ED work up including potential diagnostic studies and interventions.   6:23 PM Rechecked and updated the patient. We discussed the plan for discharge and the patient is agreeable. Reviewed supportive cares, symptomatic treatment, outpatient follow up, and reasons to return to the Emergency Department. Patient to be discharged by ED RN.          At the conclusion of the encounter I discussed the results of all the tests and the disposition. The questions were answered. The patient or family acknowledged understanding and was agreeable with the care plan.                  MEDICATIONS GIVEN IN THE EMERGENCY DEPARTMENT:  Medications   lactated ringers BOLUS 1,000 mL (0 mLs Intravenous Stopped 11/23/22 1652)   lactated ringers BOLUS 500 mL (0 mLs Intravenous Stopped 11/23/22 1739)       NEW PRESCRIPTIONS STARTED AT TODAY'S ED VISIT:  Discharge Medication List as of 11/23/2022  6:24 PM      START taking these medications    Details   acetaminophen (TYLENOL) 500 MG tablet Take 2 tablets (1,000 mg) by mouth every 6 hours for 7 days, Disp-56 tablet, R-0, Local Print      cephALEXin (KEFLEX) 500 MG capsule Take 1 capsule (500 mg) by mouth 2 times daily  for 7 days, Disp-14 capsule, R-0, Local Print      !! dimenhyDRINATE (DRAMAMINE) 50 MG tablet Take 1 tablet (50 mg) by mouth At Bedtime for 7 days, Disp-7 tablet, R-0, Local Print      !! dimenhyDRINATE (DRAMAMINE) 50 MG tablet Take 1 tablet (50 mg) by mouth every 6 hours as needed for other (kidney stone pain management), Disp-28 tablet, R-0, Local Print      ibuprofen (ADVIL/MOTRIN) 200 MG tablet Take 2 tablets (400 mg) by mouth every 6 hours for 7 days, Disp-56 tablet, R-0, Local Print      oxyCODONE (ROXICODONE) 5 MG tablet Take 1 tablet (5 mg) by mouth every 4 hours as needed for severe pain (7-10) If pain is not improved with acetaminophen and ibuprofen., Disp-6 tablet, R-0, Local Print       !! - Potential duplicate medications found. Please discuss with provider.          HPI     Patient information obtained from: patient and daughter-in-law    Use of Interpretor:  Yes (In Person) - Language Pina Price GERARDO Esqueda is a 82 year old male with a pertinent history of nephrolithiasis, type 2 diabetes mellitus, hypercholesterolemia, hypertension, CKD (stage 4), who presents to this ED via walk-in with daughter in law for evaluation of abdominal pain.    Patient reports worsening left sided abdominal pain and left sided flank pain that started 2 days ago. He states the pain has been intermittent but when he is in pain, the pain is a 10/10. Right now, he denies any pain. He denies fever, vomiting, chills, change in bowel habits, chest pain, and shortness of breath. There were no other concerns/complaints at this time.        REVIEW OF SYSTEMS:  Review of Systems   Constitutional: Negative for fever, malaise, chills  HEENT: Negative runny nose, sore throat, ear pain, neck pain  Respiratory: Negative for shortness of breath, cough, congestion  Cardiovascular: Negative for chest pain, leg edema  Gastrointestinal: Endorses abdominal pain (left sided). Negative for abdominal distention, constipation, vomiting, nausea,  diarrhea, change in bowel habits  Genitourinary: Endorses left sided flank pain. Negative for dysuria and hematuria.   Integument: Negative for rash, skin breakdown  Neurological: Negative for paresthesias, weakness, headache.  Musculoskeletal: Negative for joint pain, joint swelling      All other systems reviewed and are negative.          MEDICAL HISTORY     Past Medical History:   Diagnosis Date     Nephrolithiasis        History reviewed. No pertinent surgical history.    Social History     Tobacco Use     Smoking status: Former     Packs/day: 0.00     Types: Cigarettes     Smokeless tobacco: Never     Tobacco comments:     QUIT 5-6 YEARS AGO       acetaminophen (TYLENOL) 500 MG tablet  cephALEXin (KEFLEX) 500 MG capsule  dimenhyDRINATE (DRAMAMINE) 50 MG tablet  dimenhyDRINATE (DRAMAMINE) 50 MG tablet  ibuprofen (ADVIL/MOTRIN) 200 MG tablet  oxyCODONE (ROXICODONE) 5 MG tablet  Alcohol Swabs PADS  glimepiride (AMARYL) 4 MG tablet  losartan (COZAAR) 50 MG tablet  OneTouch Delica Lancets 33G MISC  ONETOUCH VERIO IQ test strip            PHYSICAL EXAM     BP (!) 141/67   Pulse 70   Temp 97.8  F (36.6  C) (Temporal)   Resp 16   Ht 1.524 m (5')   Wt 58.1 kg (128 lb)   SpO2 99%   BMI 25.00 kg/m        PHYSICAL EXAM:     General: Patient appears well, nontoxic, comfortable  HEENT: Moist mucous membranes,  No head trauma.  No midline neck pain.  Cardiovascular: Normal rate, normal rhythm, no extremity edema.  No appreciable murmur.  Respiratory: No signs of respiratory distress, lungs are clear to auscultation bilaterally with no wheezes rhonchi or rales.  Abdominal: Soft, nontender, nondistended, no palpable masses, no guarding, no rebound, no bilobar changes  Musculoskeletal: Full range of motion of joints, no deformities appreciated.  Neurological: Alert and oriented, grossly neurologically intact.  Psychological: Normal affect and mood.  Integument: No rashes appreciated          RESULTS       Labs Ordered  and Resulted from Time of ED Arrival to Time of ED Departure   BASIC METABOLIC PANEL - Abnormal       Result Value    Sodium 138      Potassium 3.9      Chloride 112 (*)     Carbon Dioxide (CO2) 17 (*)     Anion Gap 9      Urea Nitrogen 40 (*)     Creatinine 3.38 (*)     Calcium 8.7      Glucose 132 (*)     GFR Estimate 17 (*)    LIPASE - Abnormal    Lipase 54 (*)    ROUTINE UA WITH MICROSCOPIC REFLEX TO CULTURE - Abnormal    Color Urine Light Yellow      Appearance Urine Clear      Glucose Urine Negative      Bilirubin Urine Negative      Ketones Urine Negative      Specific Gravity Urine 1.017      Blood Urine 0.5 mg/dL (*)     pH Urine 5.5      Protein Albumin Urine 30 (*)     Urobilinogen Urine <2.0      Nitrite Urine Negative      Leukocyte Esterase Urine Negative      Bacteria Urine Few (*)     Mucus Urine Present (*)     RBC Urine 5 (*)     WBC Urine 4     CBC WITH PLATELETS AND DIFFERENTIAL - Abnormal    WBC Count 9.6      RBC Count 3.94 (*)     Hemoglobin 12.2 (*)     Hematocrit 36.3 (*)     MCV 92      MCH 31.0      MCHC 33.6      RDW 13.0      Platelet Count 161      % Neutrophils 58      % Lymphocytes 29      % Monocytes 9      % Eosinophils 4      % Basophils 0      % Immature Granulocytes 0      NRBCs per 100 WBC 0      Absolute Neutrophils 5.4      Absolute Lymphocytes 2.8      Absolute Monocytes 0.9      Absolute Eosinophils 0.4      Absolute Basophils 0.0      Absolute Immature Granulocytes 0.0      Absolute NRBCs 0.0     CRP INFLAMMATION - Abnormal    CRP 2.8 (*)    BASIC METABOLIC PANEL - Abnormal    Sodium 138      Potassium 4.0      Chloride 112 (*)     Carbon Dioxide (CO2) 16 (*)     Anion Gap 10      Urea Nitrogen 42 (*)     Creatinine 3.33 (*)     Calcium 8.8      Glucose 71      GFR Estimate 18 (*)    BASIC METABOLIC PANEL - Abnormal    Sodium 140      Potassium 3.8      Chloride 114 (*)     Carbon Dioxide (CO2) 17 (*)     Anion Gap 9      Urea Nitrogen 37 (*)     Creatinine 2.81 (*)      Calcium 8.2 (*)     Glucose 111      GFR Estimate 21 (*)    HEPATIC FUNCTION PANEL - Normal    Bilirubin Total 0.6      Bilirubin Direct 0.2      Protein Total 7.2      Albumin 3.7      Alkaline Phosphatase 102      AST 10      ALT <9         CT Abdomen Pelvis w/o Contrast   Final Result   IMPRESSION:    1.  Tiny obstructing 0.2 cm mid left ureteral calculus. Additional 1.0 cm calculus in the bladder apex.      2.  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 Follow up recommendations below:      REFERENCE:   SVS practice guidelines on the care of patients with an abdominal aortic aneurysm. Laina SOUZA. J Vasc Surg, 2018. PMID: [id number].      Aorta size: 3.0 cm to 3.9 cm: Surveillance imaging at 3-year intervals.                     PROCEDURES:  Procedures:  Procedures       I, Kamryn Garcia am serving as a scribe to document services personally performed by Lupillo Fortune DO, based on my observations and the provider's statements to me.  I, Lupillo Fortune DO, attest that Kamryn Garcia is acting in a scribe capacity, has observed my performance of the services and has documented them in accordance with my direction.    Lupillo Fortune DO  Emergency Medicine  [facility] EMERGENCY ROOM     Lupillo Fortune DO  11/23/22 8955

## 2022-11-23 NOTE — ED TRIAGE NOTES
Arrives to ED from clinic with c/o R sided abd pain beginning Monday. Intermittent. Worse at night. Denies hx of abd surgeries. Denies fever/chills. Denies N/V/D.      Triage Assessment     Row Name 11/23/22 1245       Triage Assessment (Adult)    Airway WDL WDL       Respiratory WDL    Respiratory WDL WDL       Skin Circulation/Temperature WDL    Skin Circulation/Temperature WDL WDL       Cardiac WDL    Cardiac WDL WDL       Peripheral/Neurovascular WDL    Peripheral Neurovascular WDL WDL       Cognitive/Neuro/Behavioral WDL    Cognitive/Neuro/Behavioral WDL WDL

## 2022-11-23 NOTE — PROGRESS NOTES
Chief Complaint   Patient presents with     Abdominal Pain     Left side abd pain x 2 days       Assessment & Plan     ICD-10-CM    1. Left sided abdominal pain  R10.9         #1 Left side abdomina pain-  Send to ED for evaluation  Left side abnormal pain x 3 days. Comes and goes. No fever. No blood in stool. No issues with constipation or diarrhea. Pain is not worsened with movement. Pain has now started to radiate to his left back. On exam he is quite tender with palpation. No rebound or guarding. Would be concerned about kidney stone, pancreatitis or diverticulitis. Offer to order outpatient CT and labs but patient an family opted to go to ED or more prompt evaluation. They will head to [facility] right away.       No follow-ups on file.    VIKAS [facility]    Jacques Garber is a 82 year old accompanied by his spouse and daughter in law , presenting for the following health issues:  Abdominal Pain (Left side abd pain x 2 days)      The patient is a pleasant 82-year-old male who presents the clinic today for left-sided abdominal pain.  He has been experiencing left-sided abdominal pain for 3 days.  He states that the pain comes and goes and is a 10 out of 10 at its worst.  No aggravating symptoms that he can think of.  He denies any blood in his urine or stool.  No issues with constipation or diarrhea.  No nausea vomiting.  No fevers or chills.  He has not been taking anything to help with the pain.  Dates that the pain started to radiate to his back as of yesterday evening.  He does have chronic kidney disease last creatinine was 2.3.    History of Present Illness       Reason for visit:  Sharp abdominal pain left side  Symptom onset:  1-3 days ago  Symptoms include:  On and off sharp pains in abdominal region  Symptom intensity:  Severe  Symptom progression:  Staying the same  Had these symptoms before:  No    He eats 2-3 servings of fruits and vegetables  "daily.He consumes 1 sweetened beverage(s) daily.He exercises with enough effort to increase his heart rate 20 to 29 minutes per day.  He exercises with enough effort to increase his heart rate 7 days per week.   He is taking medications regularly.         Review of Systems   Constitutional: Negative for chills, fatigue and fever.   HENT: Negative.    Eyes: Negative.    Respiratory: Negative for cough, shortness of breath and wheezing.    Gastrointestinal: Positive for abdominal pain. Negative for abdominal distention, diarrhea, heartburn, nausea and vomiting.        Left side abdominal pain x 3 days. Comes and goes. 10/10 at its worst. BM have been normal. No blood in stool.    Neurological: Negative.             Objective    /72   Pulse 68   Temp 98.2  F (36.8  C) (Oral)   Resp 16   Ht 1.549 m (5' 1\")   Wt 58.1 kg (128 lb)   SpO2 97%   BMI 24.19 kg/m    Body mass index is 24.19 kg/m .  Physical Exam  Vitals and nursing note reviewed.   Eyes:      Conjunctiva/sclera: Conjunctivae normal.   Cardiovascular:      Rate and Rhythm: Normal rate and regular rhythm.      Heart sounds: No murmur heard.    No friction rub. No gallop.   Pulmonary:      Effort: Pulmonary effort is normal.      Breath sounds: No wheezing, rhonchi or rales.   Abdominal:      General: Abdomen is flat.      Palpations: Abdomen is soft.      Tenderness: There is abdominal tenderness in the left upper quadrant and left lower quadrant. There is no guarding or rebound. Negative signs include Hutchins's sign and Rovsing's sign.   Musculoskeletal:      Cervical back: Neck supple.   Skin:     General: Skin is warm and dry.      Findings: No lesion or rash.   Neurological:      General: No focal deficit present.      Mental Status: He is alert.      GCS: GCS eye subscore is 4. GCS verbal subscore is 5. GCS motor subscore is 6.      Cranial Nerves: No cranial nerve deficit.      Gait: Gait is intact.                 "

## 2022-11-24 NOTE — DISCHARGE INSTRUCTIONS
Please push oral hydration at home.  Your labs show dehydration.    Follow-up with your primary care doctor next week for repeat blood draw regarding your kidney function.    Otherwise the kidney stone [place] will be calling you most likely on Friday for follow-up.

## 2022-11-25 ENCOUNTER — TELEPHONE (OUTPATIENT)
Dept: UROLOGY | Facility: CLINIC | Age: 82
End: 2022-11-25

## 2022-11-25 NOTE — TELEPHONE ENCOUNTER
[facility] [place] spoke to patient via telephone regarding recent ER referral for stone consultation.  Offered televisit appointment with provider today but patient declined.  He states he has already passed his larger stone shown in the CT scan and has been doing well since.  Offered to have stone analyzed since this is his fourth stone that he has passed but he states he burnt the stone for traditional purposes.  None in the past has been analyzed.  Patient states he passes kidney stones every 1-2 years.  Patient will call [facility] should he decide on consultation and any new kidney stone pain or symptoms to be seen.

## 2022-11-29 ENCOUNTER — PATIENT OUTREACH (OUTPATIENT)
Dept: GERIATRIC MEDICINE | Facility: CLINIC | Age: 82
End: 2022-11-29

## 2022-11-29 NOTE — PROGRESS NOTES
[facility] Care Coordination Contact  CC received notification of Emergency Room visit.  ER visit occurred on 11/23/22 at [facility] with Dx of kidney stone.    CC contacted member and reviewed discharge summary.  Member has a follow-up appointment with PCP: No: Offered Assistance with setting up a follow up appointment  Member has had a change in condition: No  New referrals placed: No  Home Visit Needed: No  Care plan reviewed and updated.  PCP notified of ED visit via EMR.    Patient report stone expelled, no longer having pain.    Giulia Owens RN PHN  [facility]  [phone]

## 2023-01-01 ENCOUNTER — TRANSFERRED RECORDS (OUTPATIENT)
Dept: MULTI SPECIALTY CLINIC | Facility: CLINIC | Age: 83
End: 2023-01-01

## 2023-01-01 LAB — RETINOPATHY: NORMAL

## 2023-01-17 NOTE — TELEPHONE ENCOUNTER
"Routing refill request to provider for review/approval because:  Labs out of range:  creatinine    Last Written Prescription Date:  4/14/22  Last Fill Quantity: 90,  # refills: 0   Last office visit provider:  7/14/22     Requested Prescriptions   Pending Prescriptions Disp Refills     losartan (COZAAR) 50 MG tablet 90 tablet 0     Sig: Take 1 tablet (50 mg) by mouth daily       Angiotensin-II Receptors Failed - 10/3/2022  6:18 PM        Failed - Normal serum creatinine on file in past 12 months     Recent Labs   Lab Test 07/14/22  0956   CR 2.35*       Ok to refill medication if creatinine is low          Passed - Last blood pressure under 140/90 in past 12 months     BP Readings from Last 3 Encounters:   07/14/22 134/70   04/14/22 130/72   09/23/21 134/74                 Passed - Recent (12 mo) or future (30 days) visit within the authorizing provider's specialty     Patient has had an office visit with the authorizing provider or a provider within the authorizing providers department within the previous 12 mos or has a future within next 30 days. See \"Patient Info\" tab in inbasket, or \"Choose Columns\" in Meds & Orders section of the refill encounter.              Passed - Medication is active on med list        Passed - Patient is age 18 or older        Passed - Normal serum potassium on file in past 12 months     Recent Labs   Lab Test 07/14/22  0956   POTASSIUM 4.7                         Mini Gibson RN 10/04/22 12:38 PM  "
walked in to ED c/o cough and wheezing. pt is speaking in full sentences, in NAD. will continue to monitor. amendable to care. tolerable nebs well

## 2023-02-11 ENCOUNTER — HEALTH MAINTENANCE LETTER (OUTPATIENT)
Age: 83
End: 2023-02-11

## 2023-02-21 ENCOUNTER — MYC MEDICAL ADVICE (OUTPATIENT)
Dept: FAMILY MEDICINE | Facility: CLINIC | Age: 83
End: 2023-02-21

## 2023-02-21 ENCOUNTER — OFFICE VISIT (OUTPATIENT)
Dept: FAMILY MEDICINE | Facility: CLINIC | Age: 83
End: 2023-02-21
Payer: COMMERCIAL

## 2023-02-21 VITALS
BODY MASS INDEX: 24.55 KG/M2 | WEIGHT: 130 LBS | DIASTOLIC BLOOD PRESSURE: 70 MMHG | SYSTOLIC BLOOD PRESSURE: 135 MMHG | HEART RATE: 72 BPM | RESPIRATION RATE: 18 BRPM | HEIGHT: 61 IN | OXYGEN SATURATION: 100 % | TEMPERATURE: 98 F

## 2023-02-21 DIAGNOSIS — I10 HYPERTENSION, UNSPECIFIED TYPE: ICD-10-CM

## 2023-02-21 DIAGNOSIS — R79.9 ABNORMAL FINDING OF BLOOD CHEMISTRY, UNSPECIFIED: ICD-10-CM

## 2023-02-21 DIAGNOSIS — E11.22 TYPE 2 DIABETES MELLITUS WITH STAGE 4 CHRONIC KIDNEY DISEASE, WITHOUT LONG-TERM CURRENT USE OF INSULIN (H): Primary | ICD-10-CM

## 2023-02-21 DIAGNOSIS — E11.3299 NON-PROLIFERATIVE DIABETIC RETINOPATHY (H): ICD-10-CM

## 2023-02-21 DIAGNOSIS — N18.4 CKD (CHRONIC KIDNEY DISEASE) STAGE 4, GFR 15-29 ML/MIN (H): ICD-10-CM

## 2023-02-21 DIAGNOSIS — Z13.220 SCREENING FOR HYPERLIPIDEMIA: ICD-10-CM

## 2023-02-21 DIAGNOSIS — N18.4 TYPE 2 DIABETES MELLITUS WITH STAGE 4 CHRONIC KIDNEY DISEASE, WITHOUT LONG-TERM CURRENT USE OF INSULIN (H): Primary | ICD-10-CM

## 2023-02-21 LAB
ALBUMIN SERPL BCG-MCNC: 4.4 G/DL (ref 3.5–5.2)
ALP SERPL-CCNC: 121 U/L (ref 40–129)
ALT SERPL W P-5'-P-CCNC: 30 U/L (ref 10–50)
ANION GAP SERPL CALCULATED.3IONS-SCNC: 14 MMOL/L (ref 7–15)
AST SERPL W P-5'-P-CCNC: 26 U/L (ref 10–50)
BILIRUB SERPL-MCNC: 0.4 MG/DL
BUN SERPL-MCNC: 29.5 MG/DL (ref 8–23)
CALCIUM SERPL-MCNC: 9.5 MG/DL (ref 8.8–10.2)
CHLORIDE SERPL-SCNC: 109 MMOL/L (ref 98–107)
CHOLEST SERPL-MCNC: 216 MG/DL
CREAT SERPL-MCNC: 2.26 MG/DL (ref 0.67–1.17)
CREAT UR-MCNC: 126 MG/DL
DEPRECATED HCO3 PLAS-SCNC: 20 MMOL/L (ref 22–29)
GFR SERPL CREATININE-BSD FRML MDRD: 28 ML/MIN/1.73M2
GLUCOSE SERPL-MCNC: 173 MG/DL (ref 70–99)
HBA1C MFR BLD: 7.2 % (ref 0–5.6)
HDLC SERPL-MCNC: 30 MG/DL
LDLC SERPL CALC-MCNC: 145 MG/DL
MICROALBUMIN UR-MCNC: 165 MG/L
MICROALBUMIN/CREAT UR: 130.95 MG/G CR (ref 0–17)
NONHDLC SERPL-MCNC: 186 MG/DL
POTASSIUM SERPL-SCNC: 3.7 MMOL/L (ref 3.4–5.3)
PROT SERPL-MCNC: 7.7 G/DL (ref 6.4–8.3)
SODIUM SERPL-SCNC: 143 MMOL/L (ref 136–145)
TRIGL SERPL-MCNC: 207 MG/DL

## 2023-02-21 PROCEDURE — 82570 ASSAY OF URINE CREATININE: CPT | Performed by: STUDENT IN AN ORGANIZED HEALTH CARE EDUCATION/TRAINING PROGRAM

## 2023-02-21 PROCEDURE — 82043 UR ALBUMIN QUANTITATIVE: CPT | Performed by: STUDENT IN AN ORGANIZED HEALTH CARE EDUCATION/TRAINING PROGRAM

## 2023-02-21 PROCEDURE — 80053 COMPREHEN METABOLIC PANEL: CPT | Performed by: STUDENT IN AN ORGANIZED HEALTH CARE EDUCATION/TRAINING PROGRAM

## 2023-02-21 PROCEDURE — 83036 HEMOGLOBIN GLYCOSYLATED A1C: CPT | Performed by: STUDENT IN AN ORGANIZED HEALTH CARE EDUCATION/TRAINING PROGRAM

## 2023-02-21 PROCEDURE — 99214 OFFICE O/P EST MOD 30 MIN: CPT | Performed by: STUDENT IN AN ORGANIZED HEALTH CARE EDUCATION/TRAINING PROGRAM

## 2023-02-21 PROCEDURE — 36415 COLL VENOUS BLD VENIPUNCTURE: CPT | Performed by: STUDENT IN AN ORGANIZED HEALTH CARE EDUCATION/TRAINING PROGRAM

## 2023-02-21 PROCEDURE — 80061 LIPID PANEL: CPT | Performed by: STUDENT IN AN ORGANIZED HEALTH CARE EDUCATION/TRAINING PROGRAM

## 2023-02-21 RX ORDER — LOSARTAN POTASSIUM 25 MG/1
25 TABLET ORAL DAILY
Qty: 90 TABLET | Refills: 3 | Status: SHIPPED | OUTPATIENT
Start: 2023-02-21 | End: 2023-08-24

## 2023-02-21 NOTE — PROGRESS NOTES
DM2  Lab Results   Component Value Date    A1C 6.6 07/14/2022    A1C 7.2 04/14/2022    A1C 7.2 09/23/2021    A1C 6.8 12/24/2020    A1C 9.5 05/08/2020     Current management: Glimepiride 4 mg daily  Answers for HPI/ROS submitted by the patient on 2/21/2023  Frequency of checking blood sugars:: not at all  Diabetic concerns:: none  Paraesthesia present:: numbness in feet  How many servings of fruits and vegetables do you eat daily?: 2-3  On average, how many sweetened beverages do you drink each day (Examples: soda, juice, sweet tea, etc.  Do NOT count diet or artificially sweetened beverages)?: 1  How many minutes a day do you exercise enough to make your heart beat faster?: 10 to 19  How many days a week do you exercise enough to make your heart beat faster?: 7  How many days per week do you miss taking your medication?: 0

## 2023-02-21 NOTE — PROGRESS NOTES
Assessment & Plan     Type 2 diabetes mellitus with stage 4 chronic kidney disease, without long-term current use of insulin (H)  Non-proliferative diabetic retinopathy (H)  Chronic, well controlled.  Follows with ophthalmology for retinopathy.  Denies any other concerns.  Compliant with medications.  -Current management with plan for milligrams daily.  - Lipid panel reflex to direct LDL Non-fasting  - HEMOGLOBIN A1C  - Lipid panel reflex to direct LDL Non-fasting  - HEMOGLOBIN A1C    CKD (chronic kidney disease) stage 4, GFR 15-29 ml/min (H)  Chronic, low.  Patient has had a long history of stage IV CKD.  Previously referred to nephrology.  Patient did not make appointment with nephrology, as he was concerned that they may perform surgery.  Discussed CKD with his daughter-in-law who are also providers and they recommended that patient see nephrology.  Make an appointment for him patient closer to [place].  Patient is agreeable to see nephrology.  - Albumin Random Urine Quantitative with Creat Ratio  - Albumin Random Urine Quantitative with Creat Ratio    Screening for hyperlipidemia  - Lipid panel reflex to direct LDL Non-fasting  - Lipid panel reflex to direct LDL Non-fasting    Hypertension, unspecified type  Patient reports that he has been off his losartan 50 mg daily for the last couple of months due to medication not being covered by insurance.  Blood pressure appears to be well controlled 135/70.  We will reduce losartan to 25 mg daily.  Discussed with patient that if losartan is not covered, to inform me, and can send a different medication.  Patient was agreeable.  - Comprehensive metabolic panel (BMP + Alb, Alk Phos, ALT, AST, Total. Bili, TP)  - losartan (COZAAR) 25 MG tablet  Dispense: 90 tablet; Refill: 3  - Comprehensive metabolic panel (BMP + Alb, Alk Phos, ALT, AST, Total. Bili, TP)      Review of external notes as documented elsewhere in note      Return in about 3 months (around 5/21/2023) for  "Follow up DM.    Dianne Zuñiga MD  [facility]    Jacques Price is a 82 year old accompanied by his self, presenting for the following health issues:  Diabetes      History of Present Illness       Diabetes:   He presents for follow up of diabetes.  He is not checking blood glucose. He has no concerns regarding his diabetes at this time.  He is having numbness in feet.         He eats 2-3 servings of fruits and vegetables daily.He consumes 1 sweetened beverage(s) daily.He exercises with enough effort to increase his heart rate 10 to 19 minutes per day.  He exercises with enough effort to increase his heart rate 7 days per week.   He is taking medications regularly.     DM2  Lab Results   Component Value Date     A1C 6.6 07/14/2022     A1C 7.2 04/14/2022     A1C 7.2 09/23/2021     A1C 6.8 12/24/2020     A1C 9.5 05/08/2020      Current management: Glimepiride 4 mg daily  Not on gabapentin or neuropathy medication  Denies numbness of feet      HTN  - losartan 50mg daily - has not been taking due to insurance   - well controlled  - asymptomatic    CKD Stage 4  - Referred to Nephrology, did not go as he was afraid that they might do surgery, so he didn't go  - reports that daughter in law is a doctor and has scheduled him to a closer nephro. He will go.     Review of Systems   Constitutional: Negative for fever and chills.   Respiratory: Negative for cough or shortness of breath.    Cardiovascular: Negative for chest pain or chest pressure.   Gastrointestinal: Negative for nausea, vomiting, diarrhea or abdominal pain.        Objective    /70 (BP Location: Left arm, Patient Position: Sitting, Cuff Size: Adult Regular)   Pulse 72   Temp 98  F (36.7  C) (Temporal)   Resp 18   Ht 1.56 m (5' 1.42\")   Wt 59 kg (130 lb)   SpO2 100%   BMI 24.23 kg/m    Body mass index is 24.23 kg/m .  Physical Exam   PHYSICAL EXAM  General: Well developed, well nourished.  Skin:  Dry without rash.    Head:  " Normocephalic-atraumatic.    Eye:  Normal conjunctivae.     Respiratory:  Normal respiratory effort.   Gastrointestinal:  Non-distended.    Musculoskeletal:  No deformity or edema.  Neurologic: No focal deficits.  Diabetic foot exam: normal DP and PT pulses, no trophic changes or ulcerative lesions and normal sensory exam

## 2023-02-23 ENCOUNTER — TELEPHONE (OUTPATIENT)
Dept: FAMILY MEDICINE | Facility: CLINIC | Age: 83
End: 2023-02-23
Payer: COMMERCIAL

## 2023-02-23 NOTE — TELEPHONE ENCOUNTER
RN attempted to call patient with assistance of . Left voicemail instructing patient to return call.    If patient calls back, please relay provider's message below.    Thanks,  Emerita Anton RN DESMOND  [facility]      ----- Message from Dianne Zuñiga MD sent at 2/23/2023  9:23 AM CST -----  Please call patient to inform him that his urine screening continues to show leakage of protein in his urine, which indicates that he does have some kidney damage.  Continue to take losartan 25 mg daily and follow-up with nephrology.     His cholesterol levels are high.  I recommend Lifestyle modifications, including: Increasing intake of vegetables and fruits, decreasing intake of processed foods/carbohydrates/sugars, and having regular physical activity.    Electrolytes and liver function are normal.  Kidney function is stable at stage IV.  Please follow-up with nephrology as previously recommended.    Diabetes control has slightly worsened, A1c increased from 6.6 to 7.2, however given his age this is well controlled.

## 2023-02-28 ENCOUNTER — APPOINTMENT (OUTPATIENT)
Dept: INTERPRETER SERVICES | Facility: CLINIC | Age: 83
End: 2023-02-28
Payer: COMMERCIAL

## 2023-02-28 NOTE — TELEPHONE ENCOUNTER
RN called patient with assistance of  to relay lab results below. Patient verbalized understanding, denied further questions, and is in agreement with plan. Pt states he has nephrology appt scheduled.     Emerita Anton RN BSN  [facility]

## 2023-03-28 ENCOUNTER — PATIENT OUTREACH (OUTPATIENT)
Dept: GERIATRIC MEDICINE | Facility: CLINIC | Age: 83
End: 2023-03-28
Payer: COMMERCIAL

## 2023-03-28 NOTE — PROGRESS NOTES
Encounter opened due to Regulatory [employer] Update to close FVP Program.    Glenny Belle  Care Management Specialist  [facility]  [phone]

## 2023-03-28 NOTE — PROGRESS NOTES
Encounter opened due to Regulatory [employer] Update to open FVP Program.    Glenny Belle  Care Management Specialist  [facility]  [phone]

## 2023-04-07 ENCOUNTER — PATIENT OUTREACH (OUTPATIENT)
Dept: GERIATRIC MEDICINE | Facility: CLINIC | Age: 83
End: 2023-04-07
Payer: COMMERCIAL

## 2023-04-07 NOTE — PROGRESS NOTES
[facility] Care Coordination Contact    Called member to schedule annual HRA home visit. HRA has been scheduled for 4/13/23.     Giulia Owens RN PHN  [facility]  [phone]

## 2023-04-13 ENCOUNTER — PATIENT OUTREACH (OUTPATIENT)
Dept: GERIATRIC MEDICINE | Facility: CLINIC | Age: 83
End: 2023-04-13
Payer: COMMERCIAL

## 2023-04-13 ASSESSMENT — ACTIVITIES OF DAILY LIVING (ADL): DEPENDENT_IADLS:: INDEPENDENT

## 2023-04-13 NOTE — PROGRESS NOTES
[facility] Care Coordination Contact    [facility] Home Visit Assessment     Home visit for Health Risk Assessment with Albert Esqueda completed on April 13, 2023    Type of residence:: Private home - stairs  Current living arrangement:: I live in a private home with family     Assessment completed with:: Patient    Current Care Plan  Member currently receiving the following home care services:     Member currently receiving the following community resources: None      Medication Review  Medication reconciliation completed in Epic: Yes  Medication set-up & administration: Independent-does not set up.  Self-administers medications.  Medication Risk Assessment Medication (1 or more, place referral to MTM): N/A: No risk factors identified  MTM Referral Placed: No: No risk factors idenified    Mental/Behavioral Health   Depression Screening:   PHQ-2 Total Score (Adult) - Positive if 3 or more points; Administer PHQ-9 if positive: 0       Mental health DX:: No        Falls Assessment:   Fallen 2 or more times in the past year?: No   Any fall with injury in the past year?: No    ADL/IADL Dependencies:   Dependent ADLs:: Independent  Dependent IADLs:: Independent    [facility] Plan sponsored benefits: Shared information re: Silver Sneakers/gym memberships, ASA, Calcium +D.    PCA Assessment completed at visit: Not Applicable     Elderly Waiver Eligibility: No-does not meet criteria    Care Plan & Recommendations: Patient will continue to live independently or with minimal support in the community. No new services or DMEs at this time.    See [facility] for detailed assessment information.    Follow-Up Plan: Member informed of future contact, plan to f/u with member with a 6 month telephone assessment.  Contact information shared with member and family, encouraged member to call with any questions or concerns at any time.    [facility] care continuum providers: Please see Snapshot and Care Management Flowsheets for  Specific details of care plan.    This CC note routed to PCP.    Giulia Owens RN N  [facility]  [phone]

## 2023-04-20 ENCOUNTER — PATIENT OUTREACH (OUTPATIENT)
Dept: GERIATRIC MEDICINE | Facility: CLINIC | Age: 83
End: 2023-04-20
Payer: COMMERCIAL

## 2023-04-20 NOTE — LETTER
April 20, 2023    KING INGRAM  [address] [place] [address]        Dear King:    At [facility], we re dedicated to improving your health and wellness. Enclosed is the Care Plan developed with you on 04/13/2023. Please review the Care Plan carefully.    As a reminder, during your visit we talked about:  Ways to manage your physical and mental health  Using health care to maintain and improve your health   Your preventive care needs     Remember to contact your care coordinator if you:  Are hospitalized, or plan to be hospitalized   Have a fall    Have a change in your physical or mental health  Need help finding support or services    If you have questions, or don t agree with your Care Plan, call me at [phone]. You can also call me if your needs change. TTY users, call the [place] [employer] at (078) or 1-212.902.6724 (tjxyqd-pu-uwefem relay service).    Sincerely,        Giulia Owens RN, PHN  [phone]  [email or web address]    [id number] accepted    [id number] (07/2022)

## 2023-04-20 NOTE — PROGRESS NOTES
[facility] Care Coordination Contact    Received after visit chart from care coordinator.  Completed following tasks: Mailed copy of care plan to client, Mailed copy of signed  POC to member and Mailed Safe Medication Disposal     Glenny Belle  Care Management Specialist  [facility]  [phone]

## 2023-06-14 ENCOUNTER — PATIENT OUTREACH (OUTPATIENT)
Dept: CARE COORDINATION | Facility: CLINIC | Age: 83
End: 2023-06-14
Payer: COMMERCIAL

## 2023-06-28 ENCOUNTER — PATIENT OUTREACH (OUTPATIENT)
Dept: CARE COORDINATION | Facility: CLINIC | Age: 83
End: 2023-06-28
Payer: COMMERCIAL

## 2023-08-21 NOTE — PROGRESS NOTES
Type 2 diabetes mellitus with stage 4 chronic kidney disease, without long-term current use of insulin (H)  Non-proliferative diabetic retinopathy (H)  Chronic, well controlled.  Follows with ophthalmology for retinopathy.  Denies any other concerns.  Compliant with medications.  Lab Results   Component Value Date    A1C 7.2 02/21/2023    A1C 6.6 07/14/2022    A1C 7.2 04/14/2022    A1C 7.2 09/23/2021    A1C 6.8 12/24/2020       -Current management with plan for milligrams daily.  - Lipid panel reflex to direct LDL Non-fasting  - HEMOGLOBIN A1C  - Lipid panel reflex to direct LDL Non-fasting  - HEMOGLOBIN A1C     CKD (chronic kidney disease) stage 4, GFR 15-29 ml/min (H)  Chronic, low.  Patient has had a long history of stage IV CKD.  Previously referred to nephrology.  Patient did not make appointment with nephrology, as he was concerned that they may perform surgery.  Discussed CKD with his daughter-in-law who are also providers and they recommended that patient see nephrology.  Make an appointment for him patient closer to [place].  Patient is agreeable to see nephrology.***  - Albumin Random Urine Quantitative with Creat Ratio  - Albumin Random Urine Quantitative with Creat Ratio     Screening for hyperlipidemia  - Lipid panel reflex to direct LDL Non-fasting  - Lipid panel reflex to direct LDL Non-fasting       Hypertension, unspecified type  Patient reports that he has been off his losartan 50 mg daily for the last couple of months due to medication not being covered by insurance.  Blood pressure appears to be well controlled 135/70.  We will reduce losartan to 25 mg daily.  Discussed with patient that if losartan is not covered, to inform me, and can send a different medication.  Patient was agreeable.  - Comprehensive metabolic panel (BMP + Alb, Alk Phos, ALT, AST, Total. Bili, TP)  - losartan (COZAAR) 25 MG tablet  Dispense: 90 tablet; Refill: 3***  - Comprehensive metabolic panel (BMP + Alb, Alk Phos,  ALT, AST, Total. Bili, TP)        SUBJECTIVE:   Albert is a 82 year old who presents for Preventive Visit.  {(!) Visit Details have not yet been documented.  Please enter Visit Details and then use this list to pull in documentation. (Optional):565339}    Are you in the first 12 months of your Medicare coverage?  { :727908}    HPI      Have you ever done Advance Care Planning? (For example, a Health Directive, POLST, or a discussion with a medical provider or your loved ones about your wishes): { :903706}    {Hearing Test Done (Optional):919609}   Fall risk  { :834237}  {If any of the above assessments are answered yes, consider ordering appropriate referrals (Optional):926047}  Cognitive Screening { :210235}    {Do you have sleep apnea, excessive snoring or daytime drowsiness? (Optional):360611}    Reviewed and updated as needed this visit by clinical staff      Problems             Reviewed and updated as needed this visit by Provider      Problems            Social History     Tobacco Use     Smoking status: Former     Packs/day: 0.00     Types: Cigarettes     Smokeless tobacco: Never     Tobacco comments:     QUIT 5-6 YEARS AGO   Substance Use Topics     Alcohol use: Not on file     {Rooming staff  Click this link to complete the Prescreen if response below is not for today's visit  Alcohol Use Prescreen >3 drinks/day or > 7 drinks/week.  If the prescreen question answer is YES, complete the full AUDIT  :977038}        7/11/2022     2:16 PM   Alcohol Use   Prescreen: >3 drinks/day or >7 drinks/week? No   {add AUDIT responses (Optional) (A score of 7 for adult men is an indication of hazardous drinking; a score of 8 or more is an indication of an alcohol use disorder.  A score of 7 or more for adult women is an indication of hazardous drinking or an alchohol use disorder):828898}  Do you have a current opioid prescription? { :836572}  Do you use any other controlled substances or medications that are not  "prescribed by a provider? {Substance Use :124272}  {Provider  If there are gaps in the social history shown above, please follow the link and refresh the note Link to Social and Substance History :798435}    {Outside tests to abstract? :513431}    {additional problems to add (Optional):726526}    Current providers sharing in care for this patient include: {Rooming staff:  Please update Care Team from storyboard, refresh this note and then delete this statement}  Patient Care Team:  Tyler Camargo MD as PCP - General (Family Medicine)  Giulia Owens, RN as Lead Care Coordinator (Primary Care - CC)  Tyler Camargo MD as Assigned PCP  Bharath Valdez MD as MD (Nephrology)    The following health maintenance items are reviewed in Epic and correct as of today:  Health Maintenance   Topic Date Due     ZOSTER IMMUNIZATION (1 of 2) Never done     COVID-19 Vaccine (5 - Pfizer series) 09/08/2022     ANNUAL REVIEW OF HM ORDERS  09/23/2022     BMP  05/21/2023     MICROALBUMIN  05/21/2023     MEDICARE ANNUAL WELLNESS VISIT  07/14/2023     A1C  08/21/2023     HEMOGLOBIN  05/23/2023     INFLUENZA VACCINE (1) 09/01/2023     EYE EXAM  09/30/2023     LIPID  02/21/2024     DIABETIC FOOT EXAM  02/21/2024     FALL RISK ASSESSMENT  04/13/2024     ADVANCE CARE PLANNING  07/14/2027     DTAP/TDAP/TD IMMUNIZATION (2 - Td or Tdap) 09/23/2031     PARATHYROID  Completed     PHOSPHORUS  Completed     PHQ-2 (once per calendar year)  Completed     Pneumococcal Vaccine: 65+ Years  Completed     URINALYSIS  Completed     ALK PHOS  Completed     IPV IMMUNIZATION  Aged Out     MENINGITIS IMMUNIZATION  Aged Out     {Chronicprobdata (optional):890185}  {Decision Support (Optional):391427}        Review of Systems  {ROS COMP (Optional):265317}    OBJECTIVE:   There were no vitals taken for this visit. Estimated body mass index is 24.23 kg/m  as calculated from the following:    Height as of 2/21/23: 1.56 m (5' 1.42\").    Weight as of 2/21/23: 59 kg (130 " lb).  Physical Exam  {Exam (Optional) :336580}    {Diagnostic Test Results (Optional):263690}    ASSESSMENT / PLAN:   {Diag Picklist:070229}    {Patient advised of split billing (Optional):760147}      COUNSELING:  {Medicare Counselin}        He reports that he has quit smoking. He has never used smokeless tobacco.      Appropriate preventive services were discussed with this patient, including applicable screening as appropriate for cardiovascular disease, diabetes, osteopenia/osteoporosis, and glaucoma.  As appropriate for age/gender, discussed screening for colorectal cancer, prostate cancer, breast cancer, and cervical cancer. Checklist reviewing preventive services available has been given to the patient.    Reviewed patients plan of care and provided an AVS. The {CarePlan:347482} for Albert meets the Care Plan requirement. This Care Plan has been established and reviewed with the {PATIENT, FAMILY MEMBER, CAREGIVER:108193}.    {Counseling Resources  [place] Preventive Services Task Force  Cholesterol Screening  Health diet/nutrition  Pooled Cohorts Equation Calculator  Department of Health and Human Services's MyPlate  ASA Prophylaxis  Lung CA Screening  Osteoporosis prevention/bone health :065132}  {Prostate Cancer Screening  Consider for men 55-69 per guidance from USPSTF :594037}    Dianne Zuñiga MD  [facility]    Identified Health Risks:  {Medicare required documentation of substance and opioid use disorders screening :730942}

## 2023-08-24 ENCOUNTER — OFFICE VISIT (OUTPATIENT)
Dept: FAMILY MEDICINE | Facility: CLINIC | Age: 83
End: 2023-08-24
Payer: COMMERCIAL

## 2023-08-24 VITALS
SYSTOLIC BLOOD PRESSURE: 151 MMHG | HEIGHT: 63 IN | BODY MASS INDEX: 22.32 KG/M2 | DIASTOLIC BLOOD PRESSURE: 81 MMHG | WEIGHT: 126 LBS | RESPIRATION RATE: 16 BRPM | OXYGEN SATURATION: 98 % | TEMPERATURE: 97.3 F | HEART RATE: 69 BPM

## 2023-08-24 DIAGNOSIS — E11.22 TYPE 2 DIABETES MELLITUS WITH STAGE 4 CHRONIC KIDNEY DISEASE, WITHOUT LONG-TERM CURRENT USE OF INSULIN (H): ICD-10-CM

## 2023-08-24 DIAGNOSIS — Z00.00 ENCOUNTER FOR MEDICARE ANNUAL WELLNESS EXAM: Primary | ICD-10-CM

## 2023-08-24 DIAGNOSIS — N18.4 CKD (CHRONIC KIDNEY DISEASE) STAGE 4, GFR 15-29 ML/MIN (H): ICD-10-CM

## 2023-08-24 DIAGNOSIS — N18.4 TYPE 2 DIABETES MELLITUS WITH STAGE 4 CHRONIC KIDNEY DISEASE, WITHOUT LONG-TERM CURRENT USE OF INSULIN (H): ICD-10-CM

## 2023-08-24 DIAGNOSIS — Z23 NEED FOR SHINGLES VACCINE: ICD-10-CM

## 2023-08-24 DIAGNOSIS — E78.00 PURE HYPERCHOLESTEROLEMIA: ICD-10-CM

## 2023-08-24 DIAGNOSIS — E11.21 TYPE 2 DIABETES MELLITUS WITH DIABETIC NEPHROPATHY, WITHOUT LONG-TERM CURRENT USE OF INSULIN (H): ICD-10-CM

## 2023-08-24 DIAGNOSIS — Z23 NEED FOR COVID-19 VACCINE: ICD-10-CM

## 2023-08-24 DIAGNOSIS — I10 HYPERTENSION, UNSPECIFIED TYPE: ICD-10-CM

## 2023-08-24 DIAGNOSIS — R80.9 MICROALBUMINURIA: ICD-10-CM

## 2023-08-24 PROBLEM — N18.30 CKD (CHRONIC KIDNEY DISEASE) STAGE 3, GFR 30-59 ML/MIN (H): Status: RESOLVED | Noted: 2017-06-23 | Resolved: 2023-08-24

## 2023-08-24 LAB
ANION GAP SERPL CALCULATED.3IONS-SCNC: 11 MMOL/L (ref 7–15)
BUN SERPL-MCNC: 31.1 MG/DL (ref 8–23)
CALCIUM SERPL-MCNC: 9.3 MG/DL (ref 8.8–10.2)
CHLORIDE SERPL-SCNC: 107 MMOL/L (ref 98–107)
CREAT SERPL-MCNC: 2.22 MG/DL (ref 0.67–1.17)
CREAT UR-MCNC: 201 MG/DL
DEPRECATED HCO3 PLAS-SCNC: 21 MMOL/L (ref 22–29)
GFR SERPL CREATININE-BSD FRML MDRD: 29 ML/MIN/1.73M2
GLUCOSE SERPL-MCNC: 131 MG/DL (ref 70–99)
HBA1C MFR BLD: 8 % (ref 0–5.6)
HGB BLD-MCNC: 13.2 G/DL (ref 13.3–17.7)
MICROALBUMIN UR-MCNC: 85.6 MG/L
MICROALBUMIN/CREAT UR: 42.59 MG/G CR (ref 0–17)
POTASSIUM SERPL-SCNC: 4.8 MMOL/L (ref 3.4–5.3)
SODIUM SERPL-SCNC: 139 MMOL/L (ref 136–145)

## 2023-08-24 PROCEDURE — 83036 HEMOGLOBIN GLYCOSYLATED A1C: CPT | Performed by: STUDENT IN AN ORGANIZED HEALTH CARE EDUCATION/TRAINING PROGRAM

## 2023-08-24 PROCEDURE — 0121A COVID-19 BIVALENT 12+ (PFIZER): CPT | Performed by: STUDENT IN AN ORGANIZED HEALTH CARE EDUCATION/TRAINING PROGRAM

## 2023-08-24 PROCEDURE — 36415 COLL VENOUS BLD VENIPUNCTURE: CPT | Performed by: STUDENT IN AN ORGANIZED HEALTH CARE EDUCATION/TRAINING PROGRAM

## 2023-08-24 PROCEDURE — 91312 COVID-19 BIVALENT 12+ (PFIZER): CPT | Performed by: STUDENT IN AN ORGANIZED HEALTH CARE EDUCATION/TRAINING PROGRAM

## 2023-08-24 PROCEDURE — 99214 OFFICE O/P EST MOD 30 MIN: CPT | Mod: 25 | Performed by: STUDENT IN AN ORGANIZED HEALTH CARE EDUCATION/TRAINING PROGRAM

## 2023-08-24 PROCEDURE — 82570 ASSAY OF URINE CREATININE: CPT | Performed by: STUDENT IN AN ORGANIZED HEALTH CARE EDUCATION/TRAINING PROGRAM

## 2023-08-24 PROCEDURE — 80048 BASIC METABOLIC PNL TOTAL CA: CPT | Performed by: STUDENT IN AN ORGANIZED HEALTH CARE EDUCATION/TRAINING PROGRAM

## 2023-08-24 PROCEDURE — G0439 PPPS, SUBSEQ VISIT: HCPCS | Performed by: STUDENT IN AN ORGANIZED HEALTH CARE EDUCATION/TRAINING PROGRAM

## 2023-08-24 PROCEDURE — 82043 UR ALBUMIN QUANTITATIVE: CPT | Performed by: STUDENT IN AN ORGANIZED HEALTH CARE EDUCATION/TRAINING PROGRAM

## 2023-08-24 PROCEDURE — 85018 HEMOGLOBIN: CPT | Performed by: STUDENT IN AN ORGANIZED HEALTH CARE EDUCATION/TRAINING PROGRAM

## 2023-08-24 RX ORDER — LOSARTAN POTASSIUM 25 MG/1
25 TABLET ORAL DAILY
Qty: 90 TABLET | Refills: 3 | Status: SHIPPED | OUTPATIENT
Start: 2023-08-24 | End: 2024-09-03

## 2023-08-24 RX ORDER — GLIMEPIRIDE 4 MG/1
TABLET ORAL
Qty: 90 TABLET | Refills: 3 | Status: SHIPPED | OUTPATIENT
Start: 2023-08-24 | End: 2024-09-03

## 2023-08-24 ASSESSMENT — ENCOUNTER SYMPTOMS
HEARTBURN: 0
SHORTNESS OF BREATH: 0
DIZZINESS: 0
FEVER: 0
MYALGIAS: 0
CONSTIPATION: 0
NERVOUS/ANXIOUS: 0
ARTHRALGIAS: 0
PALPITATIONS: 0
FREQUENCY: 0
NAUSEA: 0
PARESTHESIAS: 0
ABDOMINAL PAIN: 0
HEMATOCHEZIA: 0
DYSURIA: 0
CHILLS: 0
WEAKNESS: 0
HEADACHES: 0
SORE THROAT: 0
COUGH: 0
DIARRHEA: 0
HEMATURIA: 0
EYE PAIN: 0
JOINT SWELLING: 0

## 2023-08-24 ASSESSMENT — ACTIVITIES OF DAILY LIVING (ADL): CURRENT_FUNCTION: NO ASSISTANCE NEEDED

## 2023-08-24 NOTE — PROGRESS NOTES
"SUBJECTIVE:   Albert is a 82 year old who presents for Preventive Visit.      8/24/2023     8:58 AM   Additional Questions   Roomed by eh   Accompanied by self       Are you in the first 12 months of your Medicare coverage?  No    Healthy Habits:     In general, how would you rate your overall health?  Good    Frequency of exercise:  6-7 days/week    Duration of exercise:  Greater than 60 minutes    Do you usually eat at least 4 servings of fruit and vegetables a day, include whole grains    & fiber and avoid regularly eating high fat or \"junk\" foods?  Yes    Taking medications regularly:  Yes    Medication side effects:  None    Ability to successfully perform activities of daily living:  No assistance needed    Home Safety:  No safety concerns identified    Hearing Impairment:  No hearing concerns    In the past 6 months, have you been bothered by leaking of urine?  No    In general, how would you rate your overall mental or emotional health?  Good    Additional concerns today:  No         Have you ever done Advance Care Planning? (For example, a Health Directive, POLST, or a discussion with a medical provider or your loved ones about your wishes): Yes, advance care planning is on file.    Fall risk  Fallen 2 or more times in the past year?: No  Any fall with injury in the past year?: No    Cognitive Screening   1) Repeat 3 items (Leader, Season, Table)    2) Clock draw: ABNORMAL    3) 3 item recall: Recalls 2 objects   Results: ABNORMAL clock, 1-2 items recalled: PROBABLE COGNITIVE IMPAIRMENT, **INFORM PROVIDER**    Mini-CogTM Copyright ERIC Villasenor. Licensed by the author for use in [facility]; reprinted with permission ([place]@.[facility]). All rights reserved.          Reviewed and updated as needed this visit by clinical staff   Tobacco  Allergies  Meds  Problems             Reviewed and updated as needed this visit by Provider      Problems            Social History     Tobacco Use    Smoking status: " Former     Packs/day: 0.00     Types: Cigarettes    Smokeless tobacco: Never    Tobacco comments:     QUIT 5-6 YEARS AGO   Substance Use Topics    Alcohol use: Not on file             8/24/2023     8:58 AM   Alcohol Use   Prescreen: >3 drinks/day or >7 drinks/week? No     Do you have a current opioid prescription? No  Do you use any other controlled substances or medications that are not prescribed by a provider? None  956}      Current providers sharing in care for this patient include:   Patient Care Team:  Tyler Camargo MD as PCP - General (Family Medicine)  Giulia Owens, RN as Lead Care Coordinator (Primary Care - CC)  Tyler Camargo MD as Assigned PCP  Bharath Valdez MD as MD (Nephrology)    The following health maintenance items are reviewed in Epic and correct as of today:  Health Maintenance   Topic Date Due    ZOSTER IMMUNIZATION (1 of 2) Never done    BMP  05/21/2023    MICROALBUMIN  05/21/2023    MEDICARE ANNUAL WELLNESS VISIT  07/14/2023    HEMOGLOBIN  05/23/2023    INFLUENZA VACCINE (1) 09/01/2023    EYE EXAM  09/30/2023    COVID-19 Vaccine (6 - Pfizer series) 12/24/2023    LIPID  02/21/2024    DIABETIC FOOT EXAM  02/21/2024    A1C  02/24/2024    ANNUAL REVIEW OF HM ORDERS  08/24/2024    FALL RISK ASSESSMENT  08/24/2024    ADVANCE CARE PLANNING  08/24/2028    DTAP/TDAP/TD IMMUNIZATION (2 - Td or Tdap) 09/23/2031    PARATHYROID  Completed    PHOSPHORUS  Completed    PHQ-2 (once per calendar year)  Completed    Pneumococcal Vaccine: 65+ Years  Completed    URINALYSIS  Completed    ALK PHOS  Completed    IPV IMMUNIZATION  Aged Out    MENINGITIS IMMUNIZATION  Aged Out         Review of Systems   Constitutional:  Negative for chills and fever.   HENT:  Negative for congestion, ear pain, hearing loss and sore throat.    Eyes:  Negative for pain and visual disturbance.   Respiratory:  Negative for cough and shortness of breath.    Cardiovascular:  Negative for chest pain, palpitations and peripheral edema.  "  Gastrointestinal:  Negative for abdominal pain, constipation, diarrhea, heartburn, hematochezia and nausea.   Genitourinary:  Negative for dysuria, frequency, genital sores, hematuria, impotence, penile discharge and urgency.   Musculoskeletal:  Negative for arthralgias, joint swelling and myalgias.   Skin:  Negative for rash.   Neurological:  Negative for dizziness, weakness, headaches and paresthesias.   Psychiatric/Behavioral:  Negative for mood changes. The patient is not nervous/anxious.          OBJECTIVE:   BP (!) 151/81 (BP Location: Left arm, Patient Position: Sitting, Cuff Size: Adult Small)   Pulse 69   Temp 97.3  F (36.3  C) (Temporal)   Resp 16   Ht 1.588 m (5' 2.52\")   Wt 57.2 kg (126 lb)   SpO2 98%   BMI 22.66 kg/m   Estimated body mass index is 22.66 kg/m  as calculated from the following:    Height as of this encounter: 1.588 m (5' 2.52\").    Weight as of this encounter: 57.2 kg (126 lb).  Physical Exam  General Appearance:  Alert, cooperative, no distress, appears stated age.  Head:  Normocephalic, without obvious abnormality, atraumatic.  Eyes:  Conjunctivae/corneas clear, extraocular movements intact both eyes.  Lungs:  Clear to auscultation bilaterally, respirations unlabored.  Heart:  Regular rate and rhythm, S1 and S2 normal, no murmur, rub or gallop.  Abdomen:  Soft, non-tender, bowel sounds active all four quadrants.   Extremities:  Atraumatic, no cyanosis or edema.  Skin:  Skin color, texture, turgor normal, no rashes or lesions.  Neurologic: No focal deficits.      ASSESSMENT / PLAN:   Albert was seen today for physical.    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  " Continue current management for now.  Recheck today.  -     glimepiride (AMARYL) 4 MG tablet; TAKE 1 TABLET (4 MG) BY MOUTH DAILY BEFORE BREAKFAST FOR DIABETES./IB HNUB NOJ 1 LUB UA NTEJ NOJ TSHAIS PAB MARIE NTSHAV QAB ZIB  -     Hemoglobin A1c; Future  -     Hemoglobin A1c    Hypertension, unspecified type  Chronic, slightly elevated today, however patient reports that he normally takes his medication at 9 AM and has not taken it today.  Reports that his blood pressures at home are within normal range.  We will continue current dose of losartan.  -     losartan (COZAAR) 25 MG tablet; Take 1 tablet (25 mg) by mouth daily    Microalbuminuria  CKD (chronic kidney disease) stage 4, GFR 15-29 ml/min (H)  Chronic, stable. Recheck. On losartan.   -     Albumin Random Urine Quantitative with Creat Ratio; Future  -     Hemoglobin; Future  -     Basic metabolic panel  (Ca, Cl, CO2, Creat, Gluc, K, Na, BUN); Future  -     Hemoglobin  -     Basic metabolic panel  (Ca, Cl, CO2, Creat, Gluc, K, Na, BUN)  -     Albumin Random Urine Quantitative with Creat Ratio    Hypercholesterolemia  - discussed Lifestyle modifications, including: Increasing intake of vegetables and fruits, decreasing intake of processed foods/carbohydrates/sugars, having regular physical activity, and losing weight.    Need for shingles vaccine  - Will get at pharmacy    Need for COVID-19 vaccine  -     COVID-19 BIVALENT 12+ (PFIZER)        Patient has been advised of split billing requirements and indicates understanding: Yes      COUNSELING:  Reviewed preventive health counseling, as reflected in patient instructions        He reports that he has quit smoking. He has never used smokeless tobacco.      Appropriate preventive services were discussed with this patient, including applicable screening as appropriate for cardiovascular disease, diabetes, osteopenia/osteoporosis, and glaucoma.  As appropriate for age/gender, discussed screening for colorectal cancer,  prostate cancer, breast cancer, and cervical cancer. Checklist reviewing preventive services available has been given to the patient.    Reviewed patients plan of care and provided an AVS. The Intermediate Care Plan ( asthma action plan, low back pain action plan, and migraine action plan) for Albert meets the Care Plan requirement. This Care Plan has been established and reviewed with the Patient.          Dianne Zuñiga MD  [facility]  Prior to immunization administration, verified patients identity using patient s name and date of birth. Please see Immunization Activity for additional information.     Screening Questionnaire for Adult Immunization    Are you sick today?   No   Do you have allergies to medications, food, a vaccine component or latex?   No   Have you ever had a serious reaction after receiving a vaccination?   No   Do you have a long-term health problem with heart, lung, kidney, or metabolic disease (e.g., diabetes), asthma, a blood disorder, no spleen, complement component deficiency, a cochlear implant, or a spinal fluid leak?  Are you on long-term aspirin therapy?   Yes   Do you have cancer, leukemia, HIV/AIDS, or any other immune system problem?   No   Do you have a parent, brother, or sister with an immune system problem?   No   In the past 3 months, have you taken medications that affect  your immune system, such as prednisone, other steroids, or anticancer drugs; drugs for the treatment of rheumatoid arthritis, Crohn s disease, or psoriasis; or have you had radiation treatments?   No   Have you had a seizure, or a brain or other nervous system problem?   No   During the past year, have you received a transfusion of blood or blood    products, or been given immune (gamma) globulin or antiviral drug?   No   For women: Are you pregnant or is there a chance you could become       pregnant during the next month?   No   Have you received any vaccinations in the past 4 weeks?    No     Immunization questionnaire was positive for at least one answer.  Notified provider.      Patient instructed to remain in clinic for 15 minutes afterwards, and to report any adverse reactions.     Screening performed by Mckinley Porras MA on 8/24/2023 at 9:08 AM.       Identified Health Risks:  I have reviewed Opioid Use Disorder and Substance Use Disorder risk factors and made any needed referrals.

## 2023-08-24 NOTE — PATIENT INSTRUCTIONS
Patient Education   Personalized Prevention Plan  You are due for the preventive services outlined below.  Your care team is available to assist you in scheduling these services.  If you have already completed any of these items, please share that information with your care team to update in your medical record.  Health Maintenance Due   Topic Date Due     Zoster (Shingles) Vaccine (1 of 2) Never done     COVID-19 Vaccine (5 - Pfizer series) 09/08/2022     ANNUAL REVIEW OF HM ORDERS  09/23/2022     Basic Metabolic Panel  05/21/2023     Kidney Microalbumin Urine Test  05/21/2023     Annual Wellness Visit  07/14/2023     A1C Lab  08/21/2023     Hemoglobin  05/23/2023

## 2023-09-01 ENCOUNTER — TELEPHONE (OUTPATIENT)
Dept: FAMILY MEDICINE | Facility: CLINIC | Age: 83
End: 2023-09-01
Payer: COMMERCIAL

## 2023-09-01 ENCOUNTER — APPOINTMENT (OUTPATIENT)
Dept: INTERPRETER SERVICES | Facility: CLINIC | Age: 83
End: 2023-09-01
Payer: COMMERCIAL

## 2023-09-01 NOTE — TELEPHONE ENCOUNTER
Contacted patient and discussed results. Patient verbalized understanding.    ----- Message from Dianne Zuñiga MD sent at 9/1/2023  1:09 PM CDT -----  Urine screening again shows that you are leaking some protein into your urine, however slightly improved from the last time we checked.  Continue taking losartan as this will help protect your kidneys.    Electrolytes are normal.  Kidney function is stable.    Anemia is stable.    Diabetes is still within goal range of 8 or below.

## 2023-09-21 ENCOUNTER — PATIENT OUTREACH (OUTPATIENT)
Dept: GERIATRIC MEDICINE | Facility: CLINIC | Age: 83
End: 2023-09-21
Payer: COMMERCIAL

## 2023-09-21 NOTE — PROGRESS NOTES
[facility] Care Coordination Contact      [facility] Six-Month Telephone Assessment    6 month telephone assessment completed on 9/21/23.    ER visits: No  Hospitalizations: No  TCU stays: No  Significant health status changes: none  Falls/Injuries: No  ADL/IADL changes: No  Changes in services: No    Caregiver Assessment follow up:  na    Goals: See POC in chart for goal progress documentation.      Will see member in 6 months for an annual health risk assessment.   Encouraged member to call CC with any questions or concerns in the meantime.     Giulia Owens RN PHN  [facility]  [phone]         Detail Level: Zone

## 2023-12-21 ENCOUNTER — PATIENT OUTREACH (OUTPATIENT)
Dept: GERIATRIC MEDICINE | Facility: CLINIC | Age: 83
End: 2023-12-21
Payer: COMMERCIAL

## 2023-12-21 NOTE — PROGRESS NOTES
[facility] Care Coordination Contact    Internal CC change effective 1/1/2024.  Mailed member CC Change letter.  Additional tasks to be completed by CMS include: update database & EPIC, enter CC Change in MMIS, and move member file.    Arminda Jaime  Case Management Specialist  [facility]  [phone]

## 2023-12-21 NOTE — LETTER
December 21, 2023    KING INGRAM  [address] [place] [address]      Dear King:    As a member of [facility] ([facility]) ([facility]), you are provided a care coordinator. I will be your new care coordinator as of 1/1/2024. I will be calling you soon to see how you are doing and determine your needs.    If you have any questions, please feel free to call me at [phone]. If you reach my voice mail, please leave a message and your phone number. If you are hearing impaired, please call the [place] Relay at [phone] or [phone] (vinidh-ur-gklomg relay service).    I look forward to speaking with you soon.    Sincerely,      Mavis Patel RN, BSN, PHN  [phone]  [email or web address] is a health plan that contracts with both Medicare and the [place] Medical Assistance (Medicaid) program to provide benefits of both programs to enrollees. Enrollment in [facility] depends on contract renewal.      [facility]  [id number] DHS Approved ([id number])  [id number] (11/18)

## 2024-03-09 ENCOUNTER — HEALTH MAINTENANCE LETTER (OUTPATIENT)
Age: 84
End: 2024-03-09

## 2024-03-21 ENCOUNTER — OFFICE VISIT (OUTPATIENT)
Dept: FAMILY MEDICINE | Facility: CLINIC | Age: 84
End: 2024-03-21
Payer: COMMERCIAL

## 2024-03-21 VITALS
HEIGHT: 62 IN | OXYGEN SATURATION: 100 % | TEMPERATURE: 97.5 F | RESPIRATION RATE: 16 BRPM | SYSTOLIC BLOOD PRESSURE: 169 MMHG | WEIGHT: 128 LBS | DIASTOLIC BLOOD PRESSURE: 80 MMHG | HEART RATE: 55 BPM | BODY MASS INDEX: 23.55 KG/M2

## 2024-03-21 DIAGNOSIS — Z23 NEEDS FLU SHOT: ICD-10-CM

## 2024-03-21 DIAGNOSIS — Z29.11 NEED FOR VACCINATION AGAINST RESPIRATORY SYNCYTIAL VIRUS: ICD-10-CM

## 2024-03-21 DIAGNOSIS — E11.3299 NON-PROLIFERATIVE DIABETIC RETINOPATHY (H): ICD-10-CM

## 2024-03-21 DIAGNOSIS — Z23 NEED FOR SHINGLES VACCINE: ICD-10-CM

## 2024-03-21 DIAGNOSIS — I10 HYPERTENSION, UNSPECIFIED TYPE: Primary | ICD-10-CM

## 2024-03-21 DIAGNOSIS — N18.4 CKD (CHRONIC KIDNEY DISEASE) STAGE 4, GFR 15-29 ML/MIN (H): ICD-10-CM

## 2024-03-21 DIAGNOSIS — R79.9 ABNORMAL FINDING OF BLOOD CHEMISTRY, UNSPECIFIED: ICD-10-CM

## 2024-03-21 DIAGNOSIS — Z23 NEED FOR COVID-19 VACCINE: ICD-10-CM

## 2024-03-21 DIAGNOSIS — E11.22 TYPE 2 DIABETES MELLITUS WITH STAGE 4 CHRONIC KIDNEY DISEASE, WITHOUT LONG-TERM CURRENT USE OF INSULIN (H): ICD-10-CM

## 2024-03-21 DIAGNOSIS — N18.4 TYPE 2 DIABETES MELLITUS WITH STAGE 4 CHRONIC KIDNEY DISEASE, WITHOUT LONG-TERM CURRENT USE OF INSULIN (H): ICD-10-CM

## 2024-03-21 LAB
HBA1C MFR BLD: 6.6 % (ref 0–5.6)
HGB BLD-MCNC: 12.6 G/DL (ref 13.3–17.7)

## 2024-03-21 PROCEDURE — 80061 LIPID PANEL: CPT | Performed by: STUDENT IN AN ORGANIZED HEALTH CARE EDUCATION/TRAINING PROGRAM

## 2024-03-21 PROCEDURE — 91320 SARSCV2 VAC 30MCG TRS-SUC IM: CPT | Performed by: STUDENT IN AN ORGANIZED HEALTH CARE EDUCATION/TRAINING PROGRAM

## 2024-03-21 PROCEDURE — G0008 ADMIN INFLUENZA VIRUS VAC: HCPCS | Performed by: STUDENT IN AN ORGANIZED HEALTH CARE EDUCATION/TRAINING PROGRAM

## 2024-03-21 PROCEDURE — 80053 COMPREHEN METABOLIC PANEL: CPT | Performed by: STUDENT IN AN ORGANIZED HEALTH CARE EDUCATION/TRAINING PROGRAM

## 2024-03-21 PROCEDURE — 36415 COLL VENOUS BLD VENIPUNCTURE: CPT | Performed by: STUDENT IN AN ORGANIZED HEALTH CARE EDUCATION/TRAINING PROGRAM

## 2024-03-21 PROCEDURE — 99214 OFFICE O/P EST MOD 30 MIN: CPT | Mod: 25 | Performed by: STUDENT IN AN ORGANIZED HEALTH CARE EDUCATION/TRAINING PROGRAM

## 2024-03-21 PROCEDURE — 85018 HEMOGLOBIN: CPT | Performed by: STUDENT IN AN ORGANIZED HEALTH CARE EDUCATION/TRAINING PROGRAM

## 2024-03-21 PROCEDURE — 90480 ADMN SARSCOV2 VAC 1/ONLY CMP: CPT | Performed by: STUDENT IN AN ORGANIZED HEALTH CARE EDUCATION/TRAINING PROGRAM

## 2024-03-21 PROCEDURE — 90662 IIV NO PRSV INCREASED AG IM: CPT | Performed by: STUDENT IN AN ORGANIZED HEALTH CARE EDUCATION/TRAINING PROGRAM

## 2024-03-21 PROCEDURE — 83036 HEMOGLOBIN GLYCOSYLATED A1C: CPT | Performed by: STUDENT IN AN ORGANIZED HEALTH CARE EDUCATION/TRAINING PROGRAM

## 2024-03-21 RX ORDER — RESPIRATORY SYNCYTIAL VIRUS VACCINE 120MCG/0.5
0.5 KIT INTRAMUSCULAR ONCE
Qty: 1 EACH | Refills: 0 | Status: CANCELLED | OUTPATIENT
Start: 2024-03-21 | End: 2024-03-21

## 2024-03-21 NOTE — PATIENT INSTRUCTIONS
Check with your pharmacy regarding getting the RSV vaccine, and shingles vaccine, as Medicare will not cover these in clinic, however, they will cover it if you get at the pharmacy.

## 2024-03-21 NOTE — PROGRESS NOTES
Albert was seen today for office visit.    Diagnoses and all orders for this visit:    Hypertension, unspecified type  Comments:  Chronic, Uncontrolled.  Currently on losartan 25 mg daily. Discussed, pt reports that ambulatory BPs are normal.  Ordered ambulatory blood pressure cuff for patient, will have him take ambulatory blood pressures.  RN to call in 2 weeks to check on blood pressures at home.  If well-controlled, continue current management.  If uncontrolled, schedule follow-up.  Orders:  -     Miscellaneous Order for DME - ONLY FOR DME    Type 2 diabetes mellitus with stage 4 chronic kidney disease, without long-term current use of insulin (H)  Comments:  Chronic, historically well-controlled.  On glimepiride.  Recheck today.    Non-proliferative diabetic retinopathy (H)  Comments:  Chronic, stable.  Last eye exam 10/2023.  Orders:  -     Lipid panel reflex to direct LDL Non-fasting; Future  -     HEMOGLOBIN A1C; Future    CKD (chronic kidney disease) stage 4, GFR 15-29 ml/min (H)  Comments:  Chronic, stable.  Recheck today.  Orders:  -     Hemoglobin; Future  -     Albumin Random Urine Quantitative with Creat Ratio; Future  -     Comprehensive metabolic panel (BMP + Alb, Alk Phos, ALT, AST, Total. Bili, TP); Future    Needs flu shot  -     INFLUENZA VACCINE 65+ (FLUZONE HD)    Need for COVID-19 vaccine  -     COVID-19 12+ (2023-24) (PFIZER)    Need for shingles vaccine  Need to obtain at pharmacy.    Need for vaccination against respiratory syncytial virus  Comments:  Will need to obtain at pharmacy.            Subjective   Albert is a 83 year old, presenting for the following health issues:  office visit (General check up)        3/21/2024    10:16 AM   Additional Questions   Roomed by eh   Accompanied by self     History of Present Illness       Reason for visit:  Check up    He eats 2-3 servings of fruits and vegetables daily.He consumes 1 sweetened beverage(s) daily.He exercises with enough effort to  "increase his heart rate 60 or more minutes per day.  He exercises with enough effort to increase his heart rate 6 days per week.   He is taking medications regularly.       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  Currently managed with losartan 25 mg daily.        Objective    BP (!) 169/80 (BP Location: Left arm, Patient Position: Sitting, Cuff Size: Adult Regular)   Pulse 55   Temp 97.5  F (36.4  C) (Temporal)   Resp 16   Ht 1.57 m (5' 1.81\")   Wt 58.1 kg (128 lb)   SpO2 100%   BMI 23.55 kg/m    Body mass index is 23.55 kg/m .  Physical Exam   General: Well developed, well nourished.  Skin:  Dry without rash.    Head:  Normocephalic-atraumatic.    Eye:  Normal conjunctivae.     Respiratory:  Normal respiratory effort.   Gastrointestinal:  Non-distended.    Musculoskeletal:  No deformity or edema.  Neurologic: No focal deficits.  Diabetic foot exam: normal DP and PT pulses, no trophic changes or ulcerative lesions, and normal sensory exam          Signed Electronically by: Dianne Zuñiga MD    Prior to immunization administration, verified patients identity using patient s name and date of birth. Please see Immunization Activity for additional information.     Screening Questionnaire for Adult Immunization    Are you sick today?   No   Do you have allergies to medications, food, a vaccine component or latex?   No   Have you ever had a serious reaction after receiving a vaccination?   No   Do you have a long-term health problem with heart, lung, kidney, or metabolic disease (e.g., diabetes), asthma, a blood disorder, no spleen, complement component " deficiency, a cochlear implant, or a spinal fluid leak?  Are you on long-term aspirin therapy?   Yes   Do you have cancer, leukemia, HIV/AIDS, or any other immune system problem?   No   Do you have a parent, brother, or sister with an immune system problem?   No   In the past 3 months, have you taken medications that affect  your immune system, such as prednisone, other steroids, or anticancer drugs; drugs for the treatment of rheumatoid arthritis, Crohn s disease, or psoriasis; or have you had radiation treatments?   No   Have you had a seizure, or a brain or other nervous system problem?   No   During the past year, have you received a transfusion of blood or blood    products, or been given immune (gamma) globulin or antiviral drug?   No   For women: Are you pregnant or is there a chance you could become       pregnant during the next month?   No   Have you received any vaccinations in the past 4 weeks?   No     Immunization questionnaire was positive for at least one answer.  Notified provider.      Patient instructed to remain in clinic for 15 minutes afterwards, and to report any adverse reactions.     Screening performed by Mckinley Porras MA on 3/21/2024 at 10:18 AM.

## 2024-03-21 NOTE — PROGRESS NOTES
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  Continue current management for now.  Recheck today.  -     glimepiride (AMARYL) 4 MG tablet; TAKE 1 TABLET (4 MG) BY MOUTH DAILY BEFORE BREAKFAST FOR DIABETES./IB HNUB NOJ 1 LUB UA NTEJ NOJ TSHAIS PAB MARIE NTSHAV QAB ZIB  -     Hemoglobin A1c; Future  -     Hemoglobin A1c     Hypertension, unspecified type  Chronic, slightly elevated today, however patient reports that he normally takes his medication at 9 AM and has not taken it today.  Reports that his blood pressures at home are within normal range.  We will continue current dose of losartan.  -     losartan (COZAAR) 25 MG tablet; Take 1 tablet (25 mg) by mouth daily

## 2024-03-22 LAB
ALBUMIN SERPL BCG-MCNC: 4.3 G/DL (ref 3.5–5.2)
ALP SERPL-CCNC: 141 U/L (ref 40–150)
ALT SERPL W P-5'-P-CCNC: 31 U/L (ref 0–70)
ANION GAP SERPL CALCULATED.3IONS-SCNC: 9 MMOL/L (ref 7–15)
AST SERPL W P-5'-P-CCNC: 27 U/L (ref 0–45)
BILIRUB SERPL-MCNC: 0.6 MG/DL
BUN SERPL-MCNC: 30.6 MG/DL (ref 8–23)
CALCIUM SERPL-MCNC: 9.2 MG/DL (ref 8.8–10.2)
CHLORIDE SERPL-SCNC: 109 MMOL/L (ref 98–107)
CHOLEST SERPL-MCNC: 194 MG/DL
CREAT SERPL-MCNC: 2.3 MG/DL (ref 0.67–1.17)
DEPRECATED HCO3 PLAS-SCNC: 21 MMOL/L (ref 22–29)
EGFRCR SERPLBLD CKD-EPI 2021: 27 ML/MIN/1.73M2
FASTING STATUS PATIENT QL REPORTED: YES
GLUCOSE SERPL-MCNC: 112 MG/DL (ref 70–99)
HDLC SERPL-MCNC: 27 MG/DL
LDLC SERPL CALC-MCNC: 134 MG/DL
NONHDLC SERPL-MCNC: 167 MG/DL
POTASSIUM SERPL-SCNC: 4.2 MMOL/L (ref 3.4–5.3)
PROT SERPL-MCNC: 7.3 G/DL (ref 6.4–8.3)
SODIUM SERPL-SCNC: 139 MMOL/L (ref 135–145)
TRIGL SERPL-MCNC: 167 MG/DL

## 2024-03-25 ENCOUNTER — TELEPHONE (OUTPATIENT)
Dept: FAMILY MEDICINE | Facility: CLINIC | Age: 84
End: 2024-03-25
Payer: COMMERCIAL

## 2024-03-25 NOTE — TELEPHONE ENCOUNTER
Called and spoke with patient with  relayed message       ----- Message from Dianne Zuñiga MD sent at 3/23/2024  6:28 PM CDT -----  Cholesterol levels are similar to where they were last year.   Kidney function is stable.    Electrolytes and liver function are normal.    Anemia is stable.    Diabetes is well-controlled.

## 2024-03-29 ENCOUNTER — PATIENT OUTREACH (OUTPATIENT)
Dept: GERIATRIC MEDICINE | Facility: CLINIC | Age: 84
End: 2024-03-29
Payer: COMMERCIAL

## 2024-03-29 NOTE — Clinical Note
Hello,  Member was called with 4th attempts to scheduled annual health risk assessment. Left message and number to return call to schedule assessment which member never called back.  Thank you, Mavis GARCIA

## 2024-03-29 NOTE — LETTER
April 1, 2024      KING INGRAM  [address] [place] [address]        Dear King:     I m your care coordinator. I ve been unable to reach you by phone. I am writing to ask you or an authorized representative to call me at [phone]. If you reach my voicemail, please leave a message with your daytime telephone number. . Include a date and time that I can call you. If you are hearing impaired, call the [place] Relay at [phone] or [phone] (fdddet-ff-ymlhtd relay service).     The reason I am trying to reach you is:    [x] To schedule an assessment  [] For your six (6)-month check-in  [] Other:      Please call me as soon as you receive this letter. I look forward to speaking with you.    Sincerely,      Mavis Patel RN, BSN, N  [phone]  [email or web address] ([facility]) is a health plan that contracts with both Medicare and the [place] Medical Assistance (Medicaid) program to provide benefits of both programs to enrollees. Enrollment in [facility] depends on contract renewal.    [id number] accepted  [id number]                                                                         A (08/2022)

## 2024-03-29 NOTE — PROGRESS NOTES
[facility] Care Coordination Contact    Completed 4 attempts to reach client with no response.  Member is officially unable to contact effective today.  Completed MMIS entry.  Completed health plan required [facility] POC.    Follow-up Plan: CC will attempt to reach member in six months.    This CC note routed to PCP, Dianne Zuñiga.    Mavis Patel RN  [facility]  [phone]

## 2024-04-01 NOTE — PROGRESS NOTES
"[facility] Care Coordination Contact    Per CC, mailed client an \"Unable to Contact\" letter.      Glenny Belle  Care Management Specialist  [facility]  [phone]      "

## 2024-04-04 ENCOUNTER — APPOINTMENT (OUTPATIENT)
Dept: INTERPRETER SERVICES | Facility: CLINIC | Age: 84
End: 2024-04-04
Payer: COMMERCIAL

## 2024-04-04 ENCOUNTER — TELEPHONE (OUTPATIENT)
Dept: FAMILY MEDICINE | Facility: CLINIC | Age: 84
End: 2024-04-04

## 2024-04-04 NOTE — TELEPHONE ENCOUNTER
Please contact patient week of 5/6/24 for ambulatory BP readings per provider request.    Dianne Zuñiga MD  [facility]  Please call pt in 2 weeks to check ambulatory BP. If high, schedule appt to return.

## 2024-04-04 NOTE — TELEPHONE ENCOUNTER
Contacted patient with help of JUN Pillai .   - He has not checked ambulatory BP as the DME order for his home BP monitor was not covered by insurance at [employer].   - RN offered to schedule RN BP check at clinic, patient declines as he is going to [place] x1 month. Doesn't know exact dates of departure/return, but prefers not to schedule anything right now.  - Per patient insurance, BP monitor is covered through DME supplier. Will fax order to [facility] in [place] with note to contact patient when order is ready.  - Patient notified and agrees to plan. Patient will keep BP records while in [place]. Will route encounter to RN pool to contact patient for ambulatory BP records in 1 month.    Dianne Zuñiga MD  [facility] Nurse [place]  Please call pt in 2 weeks to check ambulatory BP. If high, schedule appt to return.

## 2024-05-07 NOTE — TELEPHONE ENCOUNTER
Services. [facility] ID# [id number].   Writer called and left message on patient's voicemail to call back and speak with a triage nurse.    Message:   Ambulatory blood pressure check.   If high, please schedule appointment for follow-up.     abdominal mass

## 2024-05-07 NOTE — TELEPHONE ENCOUNTER
Services. [facility] ID# [id number].  Writer called and left message on patient's voicemail to call back and speak with a triage nurse.    Message:   Ambulatory blood pressure check.   If high, please schedule appointment for follow-up.     [place]ia Cross, JOSE ENRIQUEN RN  [facility]

## 2024-05-09 NOTE — TELEPHONE ENCOUNTER
RN made 3rd attempt call to pt to follow-up on ambulatory BP readings in Hmong language. Left messages on both son (Earle, CTC on file) and pt's voice mail. with instruction to call back and ask for triage nurse and let staff know this is a return call and ask to speak to a nurse.      If pt/ family  calls back, please relay provider message below.    Will try again.    Kim ESCALANTE RN  [facility]     Contacted patient with help of JUN Pillai jen .   - He has not checked ambulatory BP as the DME order for his home BP monitor was not covered by insurance at [employer].   - RN offered to schedule RN BP check at clinic, patient declines as he is going to [place] x1 month. Doesn't know exact dates of departure/return, but prefers not to schedule anything right now.  - Per patient insurance, BP monitor is covered through DME supplier. Will fax order to [facility] in [place] with note to contact patient when order is ready.  - Patient notified and agrees to plan. Patient will keep BP records while in [place]. Will route encounter to RN pool to contact patient for ambulatory BP records in 1 month.     Dianne Zuñiga MD  [facility] Nurse [place]  Please call pt in 2 weeks to check ambulatory BP. If high, schedule appt to return.

## 2024-05-15 NOTE — TELEPHONE ENCOUNTER
Dr. Zuñiga,    Routing for [facility] only.     RN spoke to pt to follow-up on BP. Pt verb his DIL is a nurse at the [facility] and was able to provide him with a home BP machine. Pt has been checking BP at home and readings have ranges 120-130/55-65. States he has been compliant with taking BP medication. Pt declined BP appt with nurse only and verb he will call clinic if any concerns or BP is elevated.     Kim ESCALANTE RN  [facility]

## 2024-07-25 ENCOUNTER — PATIENT OUTREACH (OUTPATIENT)
Dept: CARE COORDINATION | Facility: CLINIC | Age: 84
End: 2024-07-25
Payer: COMMERCIAL

## 2024-08-08 ENCOUNTER — PATIENT OUTREACH (OUTPATIENT)
Dept: CARE COORDINATION | Facility: CLINIC | Age: 84
End: 2024-08-08
Payer: COMMERCIAL

## 2024-09-03 DIAGNOSIS — I10 HYPERTENSION, UNSPECIFIED TYPE: ICD-10-CM

## 2024-09-03 DIAGNOSIS — E11.21 TYPE 2 DIABETES MELLITUS WITH DIABETIC NEPHROPATHY, WITHOUT LONG-TERM CURRENT USE OF INSULIN (H): ICD-10-CM

## 2024-09-03 RX ORDER — GLIMEPIRIDE 4 MG/1
TABLET ORAL
Qty: 90 TABLET | Refills: 0 | Status: SHIPPED | OUTPATIENT
Start: 2024-09-03

## 2024-09-03 RX ORDER — LOSARTAN POTASSIUM 25 MG/1
TABLET ORAL
Qty: 90 TABLET | Refills: 0 | Status: SHIPPED | OUTPATIENT
Start: 2024-09-03

## 2024-09-19 ENCOUNTER — MYC REFILL (OUTPATIENT)
Dept: FAMILY MEDICINE | Facility: CLINIC | Age: 84
End: 2024-09-19
Payer: COMMERCIAL

## 2024-09-19 DIAGNOSIS — I10 HYPERTENSION, UNSPECIFIED TYPE: ICD-10-CM

## 2024-09-19 DIAGNOSIS — E11.21 TYPE 2 DIABETES MELLITUS WITH DIABETIC NEPHROPATHY, WITHOUT LONG-TERM CURRENT USE OF INSULIN (H): ICD-10-CM

## 2024-09-19 RX ORDER — GLIMEPIRIDE 4 MG/1
TABLET ORAL
Qty: 90 TABLET | Refills: 0 | OUTPATIENT
Start: 2024-09-19

## 2024-09-19 RX ORDER — LOSARTAN POTASSIUM 25 MG/1
TABLET ORAL
Qty: 90 TABLET | Refills: 0 | OUTPATIENT
Start: 2024-09-19

## 2024-10-02 ENCOUNTER — PATIENT OUTREACH (OUTPATIENT)
Dept: GERIATRIC MEDICINE | Facility: CLINIC | Age: 84
End: 2024-10-02
Payer: COMMERCIAL

## 2024-10-02 NOTE — PROGRESS NOTES
[facility] Care Coordination Contact    [facility] Care Coordination Contact      [facility] Six-Month Telephone Assessment    6 month telephone assessment unable to reach 10/2/2024.    ER visits: No  Hospitalizations: No  TCU stays: No  Significant health status changes: unable to reach unknown  Falls/Injuries: unable to reach unknown  ADL/IADL changes: unable to reach unknown  Changes in services: No    Caregiver Assessment follow up:  unable to reach    Goals: See Support Plan for goal progress documentation.      Will see member in 6 months for an annual health risk assessment.   Encouraged member to call CC with any questions or concerns in the meantime.     Mavis Patel RN  [facility]  [phone]

## 2024-10-05 ENCOUNTER — HEALTH MAINTENANCE LETTER (OUTPATIENT)
Age: 84
End: 2024-10-05

## 2024-11-01 ENCOUNTER — OFFICE VISIT (OUTPATIENT)
Dept: FAMILY MEDICINE | Facility: CLINIC | Age: 84
End: 2024-11-01
Attending: STUDENT IN AN ORGANIZED HEALTH CARE EDUCATION/TRAINING PROGRAM
Payer: COMMERCIAL

## 2024-11-01 VITALS
TEMPERATURE: 97.4 F | RESPIRATION RATE: 16 BRPM | OXYGEN SATURATION: 98 % | BODY MASS INDEX: 23.19 KG/M2 | WEIGHT: 126 LBS | HEART RATE: 59 BPM | SYSTOLIC BLOOD PRESSURE: 171 MMHG | HEIGHT: 62 IN | DIASTOLIC BLOOD PRESSURE: 78 MMHG

## 2024-11-01 DIAGNOSIS — R79.9 ABNORMAL FINDING OF BLOOD CHEMISTRY, UNSPECIFIED: ICD-10-CM

## 2024-11-01 DIAGNOSIS — N18.4 CKD (CHRONIC KIDNEY DISEASE) STAGE 4, GFR 15-29 ML/MIN (H): ICD-10-CM

## 2024-11-01 DIAGNOSIS — E11.3299 NON-PROLIFERATIVE DIABETIC RETINOPATHY (H): ICD-10-CM

## 2024-11-01 DIAGNOSIS — N18.4 TYPE 2 DIABETES MELLITUS WITH STAGE 4 CHRONIC KIDNEY DISEASE, WITHOUT LONG-TERM CURRENT USE OF INSULIN (H): ICD-10-CM

## 2024-11-01 DIAGNOSIS — E78.00 PURE HYPERCHOLESTEROLEMIA: ICD-10-CM

## 2024-11-01 DIAGNOSIS — Z00.00 ENCOUNTER FOR MEDICARE ANNUAL WELLNESS EXAM: Primary | ICD-10-CM

## 2024-11-01 DIAGNOSIS — Z23 NEED FOR SHINGLES VACCINE: ICD-10-CM

## 2024-11-01 DIAGNOSIS — E11.22 TYPE 2 DIABETES MELLITUS WITH STAGE 4 CHRONIC KIDNEY DISEASE, WITHOUT LONG-TERM CURRENT USE OF INSULIN (H): ICD-10-CM

## 2024-11-01 DIAGNOSIS — I10 HYPERTENSION, UNSPECIFIED TYPE: ICD-10-CM

## 2024-11-01 DIAGNOSIS — Z23 NEED FOR COVID-19 VACCINE: ICD-10-CM

## 2024-11-01 DIAGNOSIS — D64.9 ANEMIA, UNSPECIFIED TYPE: ICD-10-CM

## 2024-11-01 DIAGNOSIS — Z29.11 NEED FOR VACCINATION AGAINST RESPIRATORY SYNCYTIAL VIRUS: ICD-10-CM

## 2024-11-01 DIAGNOSIS — Z23 NEEDS FLU SHOT: ICD-10-CM

## 2024-11-01 LAB
EST. AVERAGE GLUCOSE BLD GHB EST-MCNC: 157 MG/DL
HBA1C MFR BLD: 7.1 % (ref 0–5.6)
HGB BLD-MCNC: 12.7 G/DL (ref 13.3–17.7)

## 2024-11-01 PROCEDURE — 82728 ASSAY OF FERRITIN: CPT | Performed by: STUDENT IN AN ORGANIZED HEALTH CARE EDUCATION/TRAINING PROGRAM

## 2024-11-01 PROCEDURE — 90480 ADMN SARSCOV2 VAC 1/ONLY CMP: CPT | Performed by: STUDENT IN AN ORGANIZED HEALTH CARE EDUCATION/TRAINING PROGRAM

## 2024-11-01 PROCEDURE — 91320 SARSCV2 VAC 30MCG TRS-SUC IM: CPT | Performed by: STUDENT IN AN ORGANIZED HEALTH CARE EDUCATION/TRAINING PROGRAM

## 2024-11-01 PROCEDURE — 83036 HEMOGLOBIN GLYCOSYLATED A1C: CPT | Performed by: STUDENT IN AN ORGANIZED HEALTH CARE EDUCATION/TRAINING PROGRAM

## 2024-11-01 PROCEDURE — 80061 LIPID PANEL: CPT | Performed by: STUDENT IN AN ORGANIZED HEALTH CARE EDUCATION/TRAINING PROGRAM

## 2024-11-01 PROCEDURE — 90662 IIV NO PRSV INCREASED AG IM: CPT | Performed by: STUDENT IN AN ORGANIZED HEALTH CARE EDUCATION/TRAINING PROGRAM

## 2024-11-01 PROCEDURE — 85018 HEMOGLOBIN: CPT | Performed by: STUDENT IN AN ORGANIZED HEALTH CARE EDUCATION/TRAINING PROGRAM

## 2024-11-01 PROCEDURE — G0439 PPPS, SUBSEQ VISIT: HCPCS | Performed by: STUDENT IN AN ORGANIZED HEALTH CARE EDUCATION/TRAINING PROGRAM

## 2024-11-01 PROCEDURE — 99214 OFFICE O/P EST MOD 30 MIN: CPT | Mod: 25 | Performed by: STUDENT IN AN ORGANIZED HEALTH CARE EDUCATION/TRAINING PROGRAM

## 2024-11-01 PROCEDURE — 36415 COLL VENOUS BLD VENIPUNCTURE: CPT | Performed by: STUDENT IN AN ORGANIZED HEALTH CARE EDUCATION/TRAINING PROGRAM

## 2024-11-01 PROCEDURE — T1013 SIGN LANG/ORAL INTERPRETER: HCPCS

## 2024-11-01 PROCEDURE — G0008 ADMIN INFLUENZA VIRUS VAC: HCPCS | Performed by: STUDENT IN AN ORGANIZED HEALTH CARE EDUCATION/TRAINING PROGRAM

## 2024-11-01 PROCEDURE — 83540 ASSAY OF IRON: CPT | Performed by: STUDENT IN AN ORGANIZED HEALTH CARE EDUCATION/TRAINING PROGRAM

## 2024-11-01 PROCEDURE — 83550 IRON BINDING TEST: CPT | Performed by: STUDENT IN AN ORGANIZED HEALTH CARE EDUCATION/TRAINING PROGRAM

## 2024-11-01 PROCEDURE — 80048 BASIC METABOLIC PNL TOTAL CA: CPT | Performed by: STUDENT IN AN ORGANIZED HEALTH CARE EDUCATION/TRAINING PROGRAM

## 2024-11-01 RX ORDER — AMLODIPINE BESYLATE 5 MG/1
5 TABLET ORAL DAILY
Qty: 60 TABLET | Refills: 1 | Status: SHIPPED | OUTPATIENT
Start: 2024-11-01

## 2024-11-01 SDOH — HEALTH STABILITY: PHYSICAL HEALTH: ON AVERAGE, HOW MANY DAYS PER WEEK DO YOU ENGAGE IN MODERATE TO STRENUOUS EXERCISE (LIKE A BRISK WALK)?: 3 DAYS

## 2024-11-01 SDOH — HEALTH STABILITY: PHYSICAL HEALTH: ON AVERAGE, HOW MANY MINUTES DO YOU ENGAGE IN EXERCISE AT THIS LEVEL?: 60 MIN

## 2024-11-01 ASSESSMENT — SOCIAL DETERMINANTS OF HEALTH (SDOH): HOW OFTEN DO YOU GET TOGETHER WITH FRIENDS OR RELATIVES?: TWICE A WEEK

## 2024-11-01 NOTE — PROGRESS NOTES
Preventive Care Visit  [facility]  Dianne Zuñiga MD, Family Medicine  Nov 1, 2024      Albert was seen today for wellness visit.    Diagnoses and all orders for this visit:    Encounter for Medicare annual wellness exam  -     PRIMARY CARE FOLLOW-UP SCHEDULING  -     REVIEW OF HEALTH MAINTENANCE PROTOCOL ORDERS    Hypertension, unspecified type  Comments:  Chronic, uncontrolled, however reports ambulatory blood pressures are WNL.  Creatinine clearance 18.  Discontinue losartan.  Start amlodipine 5.  RTC 4 weeks  Orders:  -     amLODIPine (NORVASC) 5 MG tablet; Take 1 tablet (5 mg) by mouth daily.    Type 2 diabetes mellitus with stage 4 chronic kidney disease, without long-term current use of insulin (H)  Comments:  Chronic, historically well-controlled.  Recheck today.  Continue current management.    CKD (chronic kidney disease) stage 4, GFR 15-29 ml/min (H)  Comments:  GFR 18.  Again declines seeing nephrology.  Would not want dialysis if needed.  Orders:  -     BASIC METABOLIC PANEL; Future  -     Hemoglobin; Future  -     BASIC METABOLIC PANEL  -     Hemoglobin    Non-proliferative diabetic retinopathy (H)  Comments:  Already saw eye doctor.  Will fax results.  Orders:  -     HEMOGLOBIN A1C; Future  -     HEMOGLOBIN A1C    Need for shingles vaccine  -     zoster vaccine recombinant adjuvanted (SHINGRIX) injection; Inject 0.5 mLs into the muscle once for 1 dose. Pharmacist administered    Need for vaccination against respiratory syncytial virus  -     RSV vaccine, bivalent, ABRYSVO, injection; Inject 0.5 mLs into the muscle once for 1 dose. Pharmacist administered    Hypercholesterolemia  Comments:  Chronic, recheck  Orders:  -     Lipid Profile (Chol, Trig, HDL, LDL calc); Future  -     Lipid Profile (Chol, Trig, HDL, LDL calc)    Needs flu shot  -     INFLUENZA HIGH DOSE, TRIVALENT, PF (FLUZONE)    Need for COVID-19 vaccine  -     COVID-19 12+ (PFIZER)    Abnormal finding of blood  chemistry, unspecified  -     HEMOGLOBIN A1C; Future  -     HEMOGLOBIN A1C    Other orders  -     PRIMARY CARE FOLLOW-UP SCHEDULING; Future          Subjective   Albert is a 84 year old, presenting for the following:  Wellness Visit        11/1/2024     8:52 AM   Additional Questions   Roomed by ayanna mcdonald   Accompanied by self         Via the Health Maintenance questionnaire, the patient has reported the following services have been completed -Eye Exam: N/A 2023-01-01, this information has been sent to the abstraction team.    HPI    HTN  BP Readings from Last 3 Encounters:   11/01/24 (!) 171/78   03/21/24 (!) 169/80   08/24/23 (!) 151/81     CKD4  Estimated Creatinine Clearance: 18.7 mL/min (A) (based on SCr of 2.3 mg/dL (H)).  Referred to [facility], didn't want to go.   Pt declines again  Doesn't want surgery or dialysis.     Health Care Directive  Patient does not have a Health Care Directive: Discussed advance care planning with patient; however, patient declined at this time.      11/1/2024   General Health   How would you rate your overall physical health? Good   Feel stress (tense, anxious, or unable to sleep) Not at all            11/1/2024   Nutrition   Diet: Regular (no restrictions)            11/1/2024   Exercise   Days per week of moderate/strenous exercise 3 days   Average minutes spent exercising at this level 60 min            11/1/2024   Social Factors   Frequency of gathering with friends or relatives Twice a week   Worry food won't last until get money to buy more No   Food not last or not have enough money for food? No   Do you have housing? (Housing is defined as stable permanent housing and does not include staying ouside in a car, in a tent, in an abandoned building, in an overnight shelter, or couch-surfing.) Yes   Are you worried about losing your housing? No   Lack of transportation? No   Unable to get utilities (heat,electricity)? No            11/1/2024   Fall Risk   Fallen 2 or more times  in the past year? No     No    Trouble with walking or balance? No     No        Patient-reported    Multiple values from one day are sorted in reverse-chronological order          11/1/2024   Activities of Daily Living- Home Safety   Needs help with the following daily activites Medication administration   Safety concerns in the home None of the above            11/1/2024   Dental   Dentist two times every year? Yes            11/1/2024   Hearing Screening   Hearing concerns? None of the above            11/1/2024   Driving Risk Screening   Patient/family members have concerns about driving No            11/1/2024   General Alertness/Fatigue Screening   Have you been more tired than usual lately? No            11/1/2024   Urinary Incontinence Screening   Bothered by leaking urine in past 6 months No            11/1/2024   TB Screening   Were you born outside of the [place]? Yes            Today's PHQ-2 Score:       11/1/2024     8:51 AM   PHQ-2 ( 1999 Pfizer)   Q1: Little interest or pleasure in doing things 0    Q2: Feeling down, depressed or hopeless 0    PHQ-2 Score 0    Q1: Little interest or pleasure in doing things Not at all   Q2: Feeling down, depressed or hopeless Not at all   PHQ-2 Score 0       Patient-reported           11/1/2024   Substance Use   Alcohol more than 3/day or more than 7/wk No   Do you have a current opioid prescription? No   How severe/bad is pain from 1 to 10? 0/10 (No Pain)   Do you use any other substances recreationally? No        Social History     Tobacco Use    Smoking status: Former     Types: Cigarettes     Passive exposure: Never    Smokeless tobacco: Never    Tobacco comments:     QUIT 5-6 YEARS AGO   Vaping Use    Vaping status: Never Used     Reviewed and updated as needed this visit by Provider                      Current providers sharing in care for this patient include:  Patient Care Team:  Dianne Zuñiga MD as PCP - General (Family Medicine)  Bharath Valdez MD as MD  "(Nephrology)  Dianne Zuñiga MD as Assigned PCP  Mavis Patel, RN as Lead Care Coordinator (Primary Care - CC)    The following health maintenance items are reviewed in Epic and correct as of today:  Health Maintenance   Topic Date Due    RSV VACCINE (1 - 1-dose 75+ series) Never done    ZOSTER IMMUNIZATION (2 of 2) 10/20/2023    MICROALBUMIN  11/24/2023    BMP  06/21/2024    EYE EXAM  10/18/2024    LIPID  03/21/2025    DIABETIC FOOT EXAM  03/21/2025    A1C  05/01/2025    HEMOGLOBIN  05/01/2025    MEDICARE ANNUAL WELLNESS VISIT  11/01/2025    ANNUAL REVIEW OF HM ORDERS  11/01/2025    FALL RISK ASSESSMENT  11/01/2025    ADVANCE CARE PLANNING  11/01/2029    DTAP/TDAP/TD IMMUNIZATION (2 - Td or Tdap) 09/23/2031    PARATHYROID  Completed    PHOSPHORUS  Completed    PHQ-2 (once per calendar year)  Completed    INFLUENZA VACCINE  Completed    Pneumococcal Vaccine: 65+ Years  Completed    URINALYSIS  Completed    ALK PHOS  Completed    COVID-19 Vaccine  Completed    HPV IMMUNIZATION  Aged Out    MENINGITIS IMMUNIZATION  Aged Out    RSV MONOCLONAL ANTIBODY  Aged Out            Objective    Exam  BP (!) 171/78 (BP Location: Left arm, Patient Position: Sitting, Cuff Size: Adult Regular)   Pulse 59   Temp 97.4  F (36.3  C) (Oral)   Resp 16   Ht 1.57 m (5' 1.81\")   Wt 57.2 kg (126 lb)   SpO2 98%   BMI 23.19 kg/m     Estimated body mass index is 23.19 kg/m  as calculated from the following:    Height as of this encounter: 1.57 m (5' 1.81\").    Weight as of this encounter: 57.2 kg (126 lb).    Physical Exam  General: Well developed, well nourished.  Skin:  Dry without rash.    Head:  Normocephalic-atraumatic.    Eye:  Normal conjunctivae.     Respiratory:  Normal respiratory effort.   Gastrointestinal:  Non-distended.    Musculoskeletal:  No deformity or edema.  Neurologic: No focal deficits.          11/1/2024   Mini Cog   Clock Draw Score 2 Normal   3 Item Recall 3 objects recalled   Mini Cog Total Score 5                " 7/14/2022   Vision Screen   Reason Vision Screen Not Completed Patient has seen eye doctor in the past 12 months          Signed Electronically by: Dianne Zuñiga MD Prior to immunization administration, verified patients identity using patient s name and date of birth. Please see Immunization Activity for additional information.     Screening Questionnaire for Adult Immunization    Are you sick today?   No   Do you have allergies to medications, food, a vaccine component or latex?   No   Have you ever had a serious reaction after receiving a vaccination?   No   Do you have a long-term health problem with heart, lung, kidney, or metabolic disease (e.g., diabetes), asthma, a blood disorder, no spleen, complement component deficiency, a cochlear implant, or a spinal fluid leak?  Are you on long-term aspirin therapy?   No   Do you have cancer, leukemia, HIV/AIDS, or any other immune system problem?   No   Do you have a parent, brother, or sister with an immune system problem?   No   In the past 3 months, have you taken medications that affect  your immune system, such as prednisone, other steroids, or anticancer drugs; drugs for the treatment of rheumatoid arthritis, Crohn s disease, or psoriasis; or have you had radiation treatments?   No   Have you had a seizure, or a brain or other nervous system problem?   No   During the past year, have you received a transfusion of blood or blood    products, or been given immune (gamma) globulin or antiviral drug?   No   For women: Are you pregnant or is there a chance you could become       pregnant during the next month?   No   Have you received any vaccinations in the past 4 weeks?   No     Immunization questionnaire answers were all negative.      Patient instructed to remain in clinic for 15 minutes afterwards, and to report any adverse reactions.     Screening performed by Winifred Paulino MA on 11/1/2024 at 9:08 AM.

## 2024-11-01 NOTE — PATIENT INSTRUCTIONS
"Check with your pharmacy regarding getting the RSV vaccine, and shingles vaccine, as Medicare will not cover these in clinic, however, they will cover it if you get at the pharmacy.   Patient Education   Stephania Shani Xeeb Saib Xyuas Kom Tiv Thaiv Tus Kheej  Nov yog ib co stephania shani xeeb uas peb yeej meem muab evelia tib neeg pab lawv noj qab nyob zoo. Kat zaum koj pab pawg saib xyuas yuav muaj ib co stephania shani xeeb uas tshwj xeeb evelia koj nkaus xwb. Thov nrog koj pab pawg saib xyuas sib jeremy txog kat yam uas koj toob olga kom tiv thaiv koj tus kheej.  Freeman Coj Lub Neej  Es xaws xais ntau christy 150 feeb txhua lub alatorre tiam (30 feeb txhua hnub, 5 hnub ib lub alatorre tiam).  Ua yam pab cov leeg muaj zog tuaj 2 zaug txhua lub alatorre tiam. Kat yam no pab koj tswj hwm koj lub cev qhov hnyav thiab tiv thaiv ntawm kab mob.  Txhob haus luam yeeb.  Pleev tshuaj tiv thaiv tshav kub kom thiaj tsis raug mob khees xaws ntawm nqaij tawv.  Txhua 2 mus evelia 5 xyoos yuav tau kuaj koj lub tsev evelia qhov radon. Radon yog ib agus ilya uas tsis muaj xim tsis muaj ntxhiab uas mob tau koj cov ntsws. Kom thiaj kawm ntxiv, mus saib [email or web address] thiab ntaus ntawv \"Radon in Homes.\"  Ceev cov phom kom tsis muaj mos txwv evelia hauv thiab muab xauv yenny hauv ib qho chaw nyab xeeb xws li lub txhoj, los yog muab xauv yenny thiab muab cov yawm sij zais yenny. Muab cov mos txwv xauv evelia hauv lwm qhov chaw nrug cov phom. Kom thiaj kawm ntxiv, mus saib [email or web address] thiab ntaus ntawv \"safe gun storage.\"  Freeman Noj Qab Huv  Noj 5 qho txiv hmab txiv ntoo thiab zaub txhua hnub.  Noj nplem nplej, txhuv xim av thiab cov fawm muaj nplej (los theej nplem dawb, txhuv dawb, thiab fawm dawb).  Ua tib zoo noj calcium thiab vitamin D ntau. Saib cov ntawv lo evelia pob khoom noj thiab siv zog noj kom txog 100% RDA (qhov ntau hauv ib hnub).  Yekaushikj meem kuaj ntsuas  Txhua 6 lub hli mus kuaj hniav thiab muab tu.  Txhua xyoo mus saib koj pab pawg saib xyuas freeman noj qab nyob zoo kom sib jeremy txog:  Ib yam " dab tsi uas hloov ntawm koj txoj freeman noj qab nyob zoo.  Cov tshuaj uas koj pab pawg tau hais kom siv.  Freeman saib xyuas kom tiv thaiv, freeman npaj evelia tsev neeg, thiab yuav ua li harini tiv thaiv ntawm kab mob uas ntev.  Freeman txhaj tshuaj (koob tshuaj tiv thaiv)   Cov koob tshuaj HPV (txog thaum muaj 26 xyoo), yog koj yeej tsis tau txais christy li.  Cov koob tshuaj Hepatitis B Kab Mob Siab (txog thaum muaj 59 xyoos), yog koj yeej tsis tau txais christy li.  Koob tshuaj COVID-19: Txais koob tshuaj no thaum txog caij txais.  Koob tshuaj tiv thaiv ntawm mob khaub thuas: Txais txhua xyoo.  Koob tshuaj tiv thaiv mob daig tsaig: Txais txhua 10 xyoo.  Pneumococcal, hepatitis A, thiab RSV cov koob tshuaj: Nug koj pab pawg saib xyuas shefali puas tsim nyog evelia koj txais cov no raws li koj qhov pheej hmoo.  Koob tshuaj tiv thaiv ntawm kab mob sawv hlwv (evelia cov muaj 50 xyoo rov sharon).  Freeman kuaj ntsuas evelia freeman noj qab nyob zoo  Kuaj mob ntshav qab zib:  Pib thaum muaj 35 xyoos, Mus kuaj mob ntshav qab zib txhua 3 xyoos los yog ntau zog.  Yog koj tseem tsis tau txog 35 xyoos, nug koj pab pawg saib xyuas shefali puas tsim nyog evelia koj kuaj mob ntshav qab zib.  Kuaj cholesterol: Thaum muaj 39 xyoo, pib kuaj cholesterol txhua 5 xyoos, los yog ntau christy ntawd yog kws alan mob qhia.  Kuaj txha qhov tuab (DEXA): Thaum txog 50 xyoo lawd, nug koj pab pawg saib xyuas shefali puas tsim nyog evelia koj kuaj txha shefali puas ruaj.  Kab Mob Siab Hepatitis C: Kuaj ib zaug hauv koj lub neej.  Kuaj Txoj Hlab Ntshav Hauv Plab: Nrog koj tus kws alan mob jeremy txog freeman kuaj ntsuas no yog koj:  Tau haus luam yeeb ib zaug li; thiab  Yog txiv neej; thiab  Nruab hnub nyoog 65 thiab 75.  Mob Olga Cees (kis mob dhau ntawm freeman sib deev)  Ua ntej muaj 24 xyoos: Nug koj pab pawg saib xyuas shefali puas tsim nyog kuaj evelia mob olga cees.  Christiano qab muaj 24 xyoos: Mus kuaj evelia mob olga cees yog koj muaj freeman pheej hmoo. Koj muaj freeman pheej hmoo evelia mob olga cees (suav nrog HIV) yog:  Koj sib deev nrog ntau  christy ib tug neeg.  Koj tsis siv cov hnab looj qau thaum sib deev.  Koj los yog koj tus khub deev kuaj pom tias muaj mob olga cees lawm.  Yog koj muaj freeman pheej hmoo evelia mob olga cees HIV, nug txog cov tshuaj PrEP kom tiv thaiv ntawm HIV.  Mus kuaj evelia mob olga cees HIV yam ntau christy ib zaug hauv koj lub neej, txawm yog koj muaj freeman pheej hmoo evelia mob olga cees HIV los tsis muaj los xij.  Kuaj evelia mob khees xaws  Kuaj lub ncauj tsev me nyuam evelia mob khees xaws: Yog koj muaj ib lub ncauj tsev me nyuam, erum yuav tau ib sij kuaj lub ncauj tsev me nyuam seb puas muaj mob khees xaws pib thaum muaj 21 xyoos. Neeg feem coob uas ib sij kuaj lub ncauj tsev me nyuam thiab tsis pom dab tsi txawv lawv tsum tau benny qab muaj 65 xyoos. Nrog koj tus kws alan mob sib jeremy txog qhov no.  Kuaj lub mis evelia mob khees xaws (mammogram): Yog koj tau muaj mis christy li, yuav tau ib sij kuaj lub mis pib thaum muaj 40 xyoo. Freeman kuaj no yog kom saib seb puas muaj mob khees xaws hauv lub mis.  Kuaj Txoj Hnyuv Evelia Mob Khees Xaws: Yeej tseem ceeb pib kuaj txoj hnyuv evelia mob khees xaws pib thaum muaj 45 xyoos.  Txhua 10 xyoo yuav tau kuaj txoj hnyuv (los yog ntau zog yog koj muaj freeman pheej hmoo) Los sis, nug koj tus kws alan mob txog freeman kuaj thooj quav FIT txhua xyoo los yog Cologuard txhua 1 xyoos.  Kom thiaj kawm ntxiv txog kat agus kuaj no, mus saib: [email or web address].  Yog xav tau freeman pab txog qhov no, mus saib: [email or web address].  Kuaj lub jordan kua phev (prostate) evelia mob khees xaws: Yog koj muaj ib lub jordan kua phev (prostate) thiab nruab hnub nyoog 55 mus evelia 69, nug koj tus kws alan mob shefali puas tsim nyog evelia koj kuaj lub jordan kua phev.  Kuaj ntsws evelia mob khees xaws: Yog koj haus luam yeeb roberts sim no los yog tau haus yav tas los uas muaj hnub nyoog nruab 50 xyoos mus evelia 80 xyoo, nug koj pab pawg saib xyuas shefali puas tsim nyog evelia koj yeej meem kuaj lub ntsws evelia mob khees xaws.    Evelia cov agus phiaj freeman siv ua ntaub ntawv qhia nkaus xwb. Yuav  tejal millard qhia deon ntawm koj qhov chaw alan mob. Copyright (kenyetta alexander)   2023 [facility].   Txhua txoj benjamin luoc tswj tseg kvng. Tshaj xyuas deon ntawm M [facility] Transitions [facility]. [employer] [id number] - REV 04/24.  Learning About Activities of Daily Living  What are activities of daily living?     Activities of daily living (ADLs) are the basic self-care tasks you do every day. These include eating, bathing, dressing, and moving around.  As you age, and if you have health problems, you may find that it's harder to do some of these tasks. If so, your doctor can suggest ideas that may help.  To measure what kind of help you may need, your doctor will ask how well you are able to do ADLs. Let your doctor know if there are any tasks that you are having trouble doing. This is an important first step to getting help. And when you have the help you need, you can stay as independent as possible.  How will a doctor assess your ADLs?  Asking about ADLs is part of a routine health checkup your doctor will likely do as you age. Your health check might be done in a doctor's office, in your home, or at a hospital. The goal is to find out if you are having any problems that could make it hard to care for yourself or that make it unsafe for you to be on your own.  To measure your ADLs, your doctor will ask how hard it is for you to do routine tasks. Your doctor may also want to know if you have changed the way you do a task because of a health problem. Your doctor may watch how you:  Walk back and forth.  Keep your balance while you stand or walk.  Move from sitting to standing or from a bed to a chair.  Button or unbutton a shirt or sweater.  Remove and put on your shoes.  It's common to feel a little worried or anxious if you find you can't do all the things you used to be able to do. Talking with your doctor about ADLs is a way to make sure you're as safe as possible and able to care for yourself as well  as you can. You may want to bring a caregiver, friend, or family member to your checkup. They can help you talk to your doctor.  Follow-up care is a key part of your treatment and safety. Be sure to make and go to all appointments, and call your doctor if you are having problems. It's also a good idea to know your test results and keep a list of the medicines you take.  Current as of: October 24, 2023  Content Version: 14.2    2024 [employer] [facility] [employer].   Care instructions adapted under license by your healthcare professional. If you have questions about a medical condition or this instruction, always ask your healthcare professional. [employer] disclaims any warranty or liability for your use of this information.

## 2024-11-02 LAB
ANION GAP SERPL CALCULATED.3IONS-SCNC: 12 MMOL/L (ref 7–15)
BUN SERPL-MCNC: 31.9 MG/DL (ref 8–23)
CALCIUM SERPL-MCNC: 9 MG/DL (ref 8.8–10.4)
CHLORIDE SERPL-SCNC: 108 MMOL/L (ref 98–107)
CHOLEST SERPL-MCNC: 207 MG/DL
CREAT SERPL-MCNC: 2.32 MG/DL (ref 0.67–1.17)
EGFRCR SERPLBLD CKD-EPI 2021: 27 ML/MIN/1.73M2
FASTING STATUS PATIENT QL REPORTED: YES
FASTING STATUS PATIENT QL REPORTED: YES
GLUCOSE SERPL-MCNC: 138 MG/DL (ref 70–99)
HCO3 SERPL-SCNC: 20 MMOL/L (ref 22–29)
HDLC SERPL-MCNC: 25 MG/DL
LDLC SERPL CALC-MCNC: 134 MG/DL
NONHDLC SERPL-MCNC: 182 MG/DL
POTASSIUM SERPL-SCNC: 4.5 MMOL/L (ref 3.4–5.3)
SODIUM SERPL-SCNC: 140 MMOL/L (ref 135–145)
TRIGL SERPL-MCNC: 240 MG/DL

## 2024-11-04 PROBLEM — D64.9 ANEMIA, UNSPECIFIED TYPE: Status: ACTIVE | Noted: 2024-11-04

## 2024-11-04 LAB
FERRITIN SERPL-MCNC: 535 NG/ML (ref 31–409)
IRON BINDING CAPACITY (ROCHE): 283 UG/DL (ref 240–430)
IRON SATN MFR SERPL: 42 % (ref 15–46)
IRON SERPL-MCNC: 118 UG/DL (ref 61–157)

## 2024-11-11 ENCOUNTER — TELEPHONE (OUTPATIENT)
Dept: FAMILY MEDICINE | Facility: CLINIC | Age: 84
End: 2024-11-11
Payer: COMMERCIAL

## 2024-11-11 NOTE — TELEPHONE ENCOUNTER
----- Message from Dianne Zuñiga sent at 11/8/2024  3:18 PM CST -----  Kidney function is stable.    Iron levels are normal.  Anemia is likely due to kidney disease.    Diabetes remains well-controlled.

## 2024-12-12 ENCOUNTER — OFFICE VISIT (OUTPATIENT)
Dept: FAMILY MEDICINE | Facility: CLINIC | Age: 84
End: 2024-12-12
Payer: COMMERCIAL

## 2024-12-12 VITALS
RESPIRATION RATE: 16 BRPM | BODY MASS INDEX: 23.74 KG/M2 | SYSTOLIC BLOOD PRESSURE: 135 MMHG | HEIGHT: 62 IN | OXYGEN SATURATION: 99 % | TEMPERATURE: 97.1 F | HEART RATE: 70 BPM | DIASTOLIC BLOOD PRESSURE: 73 MMHG | WEIGHT: 129 LBS

## 2024-12-12 DIAGNOSIS — D63.1 ANEMIA DUE TO STAGE 4 CHRONIC KIDNEY DISEASE (H): ICD-10-CM

## 2024-12-12 DIAGNOSIS — I10 HYPERTENSION, UNSPECIFIED TYPE: Primary | ICD-10-CM

## 2024-12-12 DIAGNOSIS — E11.21 TYPE 2 DIABETES MELLITUS WITH DIABETIC NEPHROPATHY, WITHOUT LONG-TERM CURRENT USE OF INSULIN (H): ICD-10-CM

## 2024-12-12 DIAGNOSIS — N18.4 ANEMIA DUE TO STAGE 4 CHRONIC KIDNEY DISEASE (H): ICD-10-CM

## 2024-12-12 DIAGNOSIS — N18.4 CKD (CHRONIC KIDNEY DISEASE) STAGE 4, GFR 15-29 ML/MIN (H): ICD-10-CM

## 2024-12-12 RX ORDER — AMLODIPINE BESYLATE 5 MG/1
5 TABLET ORAL DAILY
Qty: 60 TABLET | Refills: 1 | Status: CANCELLED | OUTPATIENT
Start: 2024-12-12

## 2024-12-12 RX ORDER — AMLODIPINE BESYLATE 5 MG/1
5 TABLET ORAL DAILY
Qty: 90 TABLET | Refills: 3 | Status: SHIPPED | OUTPATIENT
Start: 2024-12-12

## 2024-12-12 RX ORDER — GLIMEPIRIDE 4 MG/1
TABLET ORAL
Qty: 90 TABLET | Refills: 3 | Status: SHIPPED | OUTPATIENT
Start: 2024-12-12

## 2024-12-12 RX ORDER — BLOOD SUGAR DIAGNOSTIC
STRIP MISCELLANEOUS
Qty: 200 STRIP | Refills: 3 | Status: SHIPPED | OUTPATIENT
Start: 2024-12-12

## 2024-12-12 NOTE — PROGRESS NOTES
DM2  Lab Results   Component Value Date    A1C 7.1 11/01/2024    A1C 6.6 03/21/2024    A1C 8.0 08/24/2023    A1C 7.2 02/21/2023    A1C 6.6 07/14/2022     Hypertension, unspecified type  Comments:  Chronic, uncontrolled, however reports ambulatory blood pressures are WNL.  Creatinine clearance 18.  Discontinue losartan.  Start amlodipine 5.  RTC 4 weeks  Orders:  -     amLODIPine (NORVASC) 5 MG tablet; Take 1 tablet (5 mg) by mouth daily.

## 2024-12-12 NOTE — PROGRESS NOTES
Albert was seen today for recheck medication.    Diagnoses and all orders for this visit:    Hypertension, unspecified type  Comments:  Chronic, was switched from ACE/ARB to amlodipine at last visit 2/2 stage IV kidney disease.  Patient reports ambulatory BPs have been WNL.  BP WNL today.  Will continue amlodipine 5 mg.  Orders:  -     amLODIPine (NORVASC) 5 MG tablet; Take 1 tablet (5 mg) by mouth daily.  -RTC 6-month    Type 2 diabetes mellitus with diabetic nephropathy, without long-term current use of insulin (H)  Chronic, well-controlled.  Only on glimepiride.  Discussed risks of hypoglycemia with sulfonylureas and alternative medication, patient reports that given he has been stable on this medication, would like to continue this.  -     glimepiride (AMARYL) 4 MG tablet; TAKE 1 TABLET (4 MG) BY MOUTH DAILY BEFORE BREAKFAST FOR DIABETES./IB HNUB NOJ 1 LUB UA NTEJ NOJ TSHAIS PAB MARIE NTSHAV QAB ZIB  -     ONETOUCH VERIO IQ test strip; Use to test blood sugar 2 times daily or as directed.  -Discussed checking blood sugar if he has symptoms of hypoglycemia  -RTC 6 months    CKD (chronic kidney disease) stage 4, GFR 15-29 ml/min (H)  Anemia due to stage 4 chronic kidney disease (H)  Chronic, stable.  Declines referral to nephrology, would not want dialysis.      The longitudinal plan of care for the diagnosis(es)/condition(s) as documented were addressed during this visit. Due to the added complexity in care, I will continue to support Albert in the subsequent management and with ongoing continuity of care.      Subjective   Albert is a 84 year old, presenting for the following health issues:  Recheck Medication (No concerns)    History of Present Illness       Reason for visit:  Follow up on new med    He eats 2-3 servings of fruits and vegetables daily.He consumes 1 sweetened beverage(s) daily.He exercises with enough effort to increase his heart rate 10 to 19 minutes per day.  He exercises with enough effort to  "increase his heart rate 7 days per week.   He is taking medications regularly.       DM2  Lab Results   Component Value Date    A1C 7.1 11/01/2024    A1C 6.6 03/21/2024    A1C 8.0 08/24/2023    A1C 7.2 02/21/2023    A1C 6.6 07/14/2022     Hypertension  Reports ambulatory BPs are WNL.        Objective    /73   Pulse 70   Temp 97.1  F (36.2  C) (Temporal)   Resp 16   Ht 1.57 m (5' 1.81\")   Wt 58.5 kg (129 lb)   SpO2 99%   BMI 23.74 kg/m    Body mass index is 23.74 kg/m .  Physical Exam   General: Well developed, well nourished.  Skin:  Dry without rash.    Head:  Normocephalic-atraumatic.    Eye:  Normal conjunctivae.     Respiratory:  Normal respiratory effort.   Gastrointestinal:  Non-distended.    Musculoskeletal:  No deformity or edema.  Neurologic: No focal deficits.          Signed Electronically by: Dianne Zuñiga MD    Prior to immunization administration, verified patients identity using patient s name and date of birth. Please see Immunization Activity for additional information.     Screening Questionnaire for Adult Immunization    Are you sick today?   No   Do you have allergies to medications, food, a vaccine component or latex?   No   Have you ever had a serious reaction after receiving a vaccination?   No   Do you have a long-term health problem with heart, lung, kidney, or metabolic disease (e.g., diabetes), asthma, a blood disorder, no spleen, complement component deficiency, a cochlear implant, or a spinal fluid leak?  Are you on long-term aspirin therapy?   No   Do you have cancer, leukemia, HIV/AIDS, or any other immune system problem?   No   Do you have a parent, brother, or sister with an immune system problem?   No   In the past 3 months, have you taken medications that affect  your immune system, such as prednisone, other steroids, or anticancer drugs; drugs for the treatment of rheumatoid arthritis, Crohn s disease, or psoriasis; or have you had radiation treatments?   No   Have you " had a seizure, or a brain or other nervous system problem?   No   During the past year, have you received a transfusion of blood or blood    products, or been given immune (gamma) globulin or antiviral drug?   No   For women: Are you pregnant or is there a chance you could become       pregnant during the next month?   No   Have you received any vaccinations in the past 4 weeks?   No     Immunization questionnaire answers were all negative.      Patient instructed to remain in clinic for 15 minutes afterwards, and to report any adverse reactions.     Screening performed by Mckinley Porras MA on 12/12/2024 at 11:17 AM.

## 2024-12-18 ENCOUNTER — HOSPITAL ENCOUNTER (EMERGENCY)
Facility: HOSPITAL | Age: 84
Discharge: HOME | End: 2024-12-18
Admitting: STUDENT IN AN ORGANIZED HEALTH CARE EDUCATION/TRAINING PROGRAM
Payer: COMMERCIAL

## 2024-12-18 VITALS
TEMPERATURE: 97.7 F | DIASTOLIC BLOOD PRESSURE: 73 MMHG | HEIGHT: 65 IN | RESPIRATION RATE: 18 BRPM | OXYGEN SATURATION: 98 % | HEART RATE: 56 BPM | SYSTOLIC BLOOD PRESSURE: 158 MMHG | WEIGHT: 129 LBS | BODY MASS INDEX: 21.49 KG/M2

## 2024-12-18 LAB
ALBUMIN SERPL BCG-MCNC: 4.3 G/DL (ref 3.5–5.2)
ALP SERPL-CCNC: 129 U/L (ref 40–150)
ALT SERPL W P-5'-P-CCNC: 25 U/L (ref 0–70)
ANION GAP SERPL CALCULATED.3IONS-SCNC: 13 MMOL/L (ref 7–15)
AST SERPL W P-5'-P-CCNC: 30 U/L (ref 0–45)
BASOPHILS # BLD AUTO: 0 10E3/UL (ref 0–0.2)
BASOPHILS NFR BLD AUTO: 0 %
BILIRUB SERPL-MCNC: 0.3 MG/DL
BUN SERPL-MCNC: 35.1 MG/DL (ref 8–23)
CALCIUM SERPL-MCNC: 9 MG/DL (ref 8.8–10.4)
CHLORIDE SERPL-SCNC: 105 MMOL/L (ref 98–107)
CREAT SERPL-MCNC: 2.53 MG/DL (ref 0.67–1.17)
EGFRCR SERPLBLD CKD-EPI 2021: 24 ML/MIN/1.73M2
EOSINOPHIL # BLD AUTO: 0.5 10E3/UL (ref 0–0.7)
EOSINOPHIL NFR BLD AUTO: 5 %
ERYTHROCYTE [DISTWIDTH] IN BLOOD BY AUTOMATED COUNT: 12.7 % (ref 10–15)
GLUCOSE SERPL-MCNC: 231 MG/DL (ref 70–99)
HCO3 SERPL-SCNC: 19 MMOL/L (ref 22–29)
HCT VFR BLD AUTO: 35.3 % (ref 40–53)
HGB BLD-MCNC: 12.1 G/DL (ref 13.3–17.7)
HOLD SPECIMEN: NORMAL
IMM GRANULOCYTES # BLD: 0 10E3/UL
IMM GRANULOCYTES NFR BLD: 0 %
LYMPHOCYTES # BLD AUTO: 3.7 10E3/UL (ref 0.8–5.3)
LYMPHOCYTES NFR BLD AUTO: 38 %
MCH RBC QN AUTO: 31.2 PG (ref 26.5–33)
MCHC RBC AUTO-ENTMCNC: 34.3 G/DL (ref 31.5–36.5)
MCV RBC AUTO: 91 FL (ref 78–100)
MONOCYTES # BLD AUTO: 0.6 10E3/UL (ref 0–1.3)
MONOCYTES NFR BLD AUTO: 6 %
NEUTROPHILS # BLD AUTO: 5 10E3/UL (ref 1.6–8.3)
NEUTROPHILS NFR BLD AUTO: 51 %
NRBC # BLD AUTO: 0 10E3/UL
NRBC BLD AUTO-RTO: 0 /100
PLAT MORPH BLD: NORMAL
PLATELET # BLD AUTO: 148 10E3/UL (ref 150–450)
POTASSIUM SERPL-SCNC: 4.2 MMOL/L (ref 3.4–5.3)
PROT SERPL-MCNC: 7.4 G/DL (ref 6.4–8.3)
RBC # BLD AUTO: 3.88 10E6/UL (ref 4.4–5.9)
RBC MORPH BLD: NORMAL
SODIUM SERPL-SCNC: 137 MMOL/L (ref 135–145)
WBC # BLD AUTO: 9.7 10E3/UL (ref 4–11)

## 2024-12-18 PROCEDURE — 99281 EMR DPT VST MAYX REQ PHY/QHP: CPT

## 2024-12-18 PROCEDURE — 85004 AUTOMATED DIFF WBC COUNT: CPT | Performed by: STUDENT IN AN ORGANIZED HEALTH CARE EDUCATION/TRAINING PROGRAM

## 2024-12-18 PROCEDURE — 80053 COMPREHEN METABOLIC PANEL: CPT | Performed by: STUDENT IN AN ORGANIZED HEALTH CARE EDUCATION/TRAINING PROGRAM

## 2024-12-18 PROCEDURE — 36415 COLL VENOUS BLD VENIPUNCTURE: CPT | Performed by: STUDENT IN AN ORGANIZED HEALTH CARE EDUCATION/TRAINING PROGRAM

## 2024-12-18 ASSESSMENT — COLUMBIA-SUICIDE SEVERITY RATING SCALE - C-SSRS
6. HAVE YOU EVER DONE ANYTHING, STARTED TO DO ANYTHING, OR PREPARED TO DO ANYTHING TO END YOUR LIFE?: NO
1. IN THE PAST MONTH, HAVE YOU WISHED YOU WERE DEAD OR WISHED YOU COULD GO TO SLEEP AND NOT WAKE UP?: NO
2. HAVE YOU ACTUALLY HAD ANY THOUGHTS OF KILLING YOURSELF IN THE PAST MONTH?: NO

## 2024-12-19 NOTE — ED TRIAGE NOTES
Albert presents to triage with son who is interpreting for the patient after refusing interpretation services to the  on the iPad. Patient complains of right lower quadrant abdominal pain that started at 5 pm today. Patient has rebound tenderness.     Triage Assessment (Adult)       Row Name 12/18/24 1958          Triage Assessment    Airway WDL WDL        Respiratory WDL    Respiratory WDL WDL                      None

## 2024-12-19 NOTE — ED NOTES
Patients daughter has came to the desk multiple to ask for medications. RN explained that the patient needs to wait to see an MD. Daughter persisted that she would like to have something for him to make him more comfortable

## 2025-02-05 ENCOUNTER — PATIENT OUTREACH (OUTPATIENT)
Dept: GERIATRIC MEDICINE | Facility: CLINIC | Age: 85
End: 2025-02-05
Payer: COMMERCIAL

## 2025-02-05 NOTE — PROGRESS NOTES
[facility] Care Coordination Contact    Called member to schedule annual HRA home visit. HRA has been scheduled for 2/12/25.  Called Michelle Vásquez and scheduled an  for the home visit.    Mavis Patel RN  [facility]  [phone]

## 2025-02-12 ENCOUNTER — PATIENT OUTREACH (OUTPATIENT)
Dept: GERIATRIC MEDICINE | Facility: CLINIC | Age: 85
End: 2025-02-12
Payer: COMMERCIAL

## 2025-02-12 SDOH — HEALTH STABILITY: PHYSICAL HEALTH: ON AVERAGE, HOW MANY DAYS PER WEEK DO YOU ENGAGE IN MODERATE TO STRENUOUS EXERCISE (LIKE A BRISK WALK)?: 3 DAYS

## 2025-02-12 SDOH — HEALTH STABILITY: PHYSICAL HEALTH: ON AVERAGE, HOW MANY MINUTES DO YOU ENGAGE IN EXERCISE AT THIS LEVEL?: 40 MIN

## 2025-02-12 ASSESSMENT — SOCIAL DETERMINANTS OF HEALTH (SDOH)
HOW OFTEN DO YOU GET TOGETHER WITH FRIENDS OR RELATIVES?: TWICE A WEEK
HOW OFTEN DO YOU ATTEND CHURCH OR RELIGIOUS SERVICES?: NEVER
DO YOU BELONG TO ANY CLUBS OR ORGANIZATIONS SUCH AS CHURCH GROUPS UNIONS, FRATERNAL OR ATHLETIC GROUPS, OR SCHOOL GROUPS?: NO
HOW OFTEN DO YOU ATTENT MEETINGS OF THE CLUB OR ORGANIZATION YOU BELONG TO?: NEVER
IN A TYPICAL WEEK, HOW MANY TIMES DO YOU TALK ON THE PHONE WITH FAMILY, FRIENDS, OR NEIGHBORS?: THREE TIMES A WEEK

## 2025-02-12 ASSESSMENT — ACTIVITIES OF DAILY LIVING (ADL): DEPENDENT_IADLS:: INDEPENDENT

## 2025-02-12 NOTE — Clinical Note
Hello,  Member annual health risk assessment completed with member where he resides with son home. Member continues to be independent with adls/iadls and managing his medications. No community support needed at this time and care coordinator will reach out to member in 6 months for review. Thank you, Bernadette GARCIA

## 2025-02-12 NOTE — PROGRESS NOTES
[facility] Care Coordination Contact      [facility] Home Visit Assessment     Home visit for Health Risk Assessment with Albert CARRILLO Esqueda completed on February 12, 2025    Type of residence:: Private home - stairs  Current living arrangement:: I live in a private home with family          Current Care Plan  Member currently receiving the following home care services:     Member currently receiving the following community resources: None      Medication Review  Medication reconciliation completed in Epic: Yes  Medication set-up & administration: Independent-does not set up.  Self-administers medications.  Medication Risk Assessment Medication (1 or more, place referral to MTM): N/A: No risk factors identified  MTM Referral Placed: No: No risk factors idenified    Mental/Behavioral Health   Depression Screening:   PHQ-2 Total Score (Adult) - Positive if 3 or more points; Administer PHQ-9 if positive: 0       Mental health DX:: No        Falls Assessment:   Fallen 2 or more times in the past year?: No        ADL/IADL Dependencies:      Dependent IADLs:: Independent    Health [facility] sponsored benefits[facility]: Shared information regarding [employer]. Reviewed preventative health screening and health plan supplemental benefits/incentives. Reviewed medication disposal form and transition of care member handout.    CFSS Assessment completed at visit: Not Applicable     Elderly Waiver Eligibility: No-does not meet criteria    Care Plan & Recommendations: Member health risk assessment completed where he resides with his son. Member independent with medication management and adls/iadls and wants to increase his impendency by not dependent.    [place] detailed [place].    Follow-Up Plan: Member informed of future contact, plan to f/u with member with a 6 month telephone assessment.  Contact information shared with member and family, encouraged member to call with  any questions or concerns at any time.    [place] care continuum providers: Please see Snapshot and Care Management Flowsheets for Specific details of care plan.    This CC note routed to PCP, Dianne Zuñiga.    Mavis Patel RN  [facility]  [phone]

## 2025-03-31 ENCOUNTER — PATIENT OUTREACH (OUTPATIENT)
Dept: GERIATRIC MEDICINE | Facility: CLINIC | Age: 85
End: 2025-03-31
Payer: COMMERCIAL

## 2025-03-31 NOTE — LETTER
March 31, 2025       ALBERT INGRAM  [address] [place] [address]      Dear Albert,    At [facility], we re dedicated to improving your health and wellness. Enclosed is the Support Plan developed with you on 02/12/2025. Please review the Support Plan carefully.    As a reminder, during your visit we talked about:   Ways to manage your physical and mental health   Using health care to maintain and improve your health    Your preventive care needs      Remember to contact your care coordinator if you:   Are hospitalized or plan to be hospitalized    Have a fall     Have a change in your physical or mental health   Need help finding support or services    If you have questions or don t agree with your Support Plan, call me at [phone]. You can also call me if your needs change. TTY users call the [place] [employer] at 233 or 1-886.295.4051 (zwffoq-xe-jbhgzq relay service).    Sincerely,         Mavis Patel RN, BSN, PHN  [phone]  [email or web address]                [id number] accepted     (06/2024)                [address] [place] [address]  [phone]  fax [phone]  [facility]

## 2025-03-31 NOTE — PROGRESS NOTES
[facility] Care Coordination Contact    Received after visit chart from care coordinator.  Completed following tasks: Mailed copy of support plan to member, Mailed Safe Medication Disposal , Mailed Transition of Care Member Handout, and Uploaded consent to communicate form(s) to Epic    Glenny Belle  Care Management Specialist  [facility]  [phone]

## 2025-04-27 ENCOUNTER — HEALTH MAINTENANCE LETTER (OUTPATIENT)
Age: 85
End: 2025-04-27

## 2025-05-18 ENCOUNTER — HEALTH MAINTENANCE LETTER (OUTPATIENT)
Age: 85
End: 2025-05-18

## 2025-08-27 ENCOUNTER — PATIENT OUTREACH (OUTPATIENT)
Dept: GERIATRIC MEDICINE | Facility: CLINIC | Age: 85
End: 2025-08-27
Payer: COMMERCIAL